# Patient Record
Sex: FEMALE | Race: WHITE | NOT HISPANIC OR LATINO | Employment: OTHER | URBAN - METROPOLITAN AREA
[De-identification: names, ages, dates, MRNs, and addresses within clinical notes are randomized per-mention and may not be internally consistent; named-entity substitution may affect disease eponyms.]

---

## 2023-03-27 PROBLEM — E55.9 VITAMIN D DEFICIENCY: Status: ACTIVE | Noted: 2023-03-27

## 2023-03-27 PROBLEM — E66.9 OBESITY (BMI 30.0-34.9): Status: ACTIVE | Noted: 2023-03-27

## 2023-03-27 PROBLEM — F32.A DEPRESSION: Status: ACTIVE | Noted: 2023-03-27

## 2023-03-27 PROBLEM — K13.0 ANGULAR CHEILITIS: Status: ACTIVE | Noted: 2023-03-27

## 2023-03-27 PROBLEM — E11.9 TYPE 2 DIABETES MELLITUS (MULTI): Status: ACTIVE | Noted: 2023-03-27

## 2023-03-27 PROBLEM — E78.5 HYPERLIPIDEMIA: Status: ACTIVE | Noted: 2023-03-27

## 2023-03-27 PROBLEM — E78.1 HYPERTRIGLYCERIDEMIA: Status: ACTIVE | Noted: 2023-03-27

## 2023-03-27 PROBLEM — K76.0 FATTY LIVER: Status: ACTIVE | Noted: 2023-03-27

## 2023-03-27 PROBLEM — E66.811 OBESITY (BMI 30.0-34.9): Status: ACTIVE | Noted: 2023-03-27

## 2023-03-27 PROBLEM — K21.9 GERD WITHOUT ESOPHAGITIS: Status: ACTIVE | Noted: 2023-03-27

## 2023-03-27 PROBLEM — B35.1 ONYCHOMYCOSIS OF TOENAIL: Status: ACTIVE | Noted: 2023-03-27

## 2023-03-27 PROBLEM — M25.511 SHOULDER PAIN, RIGHT: Status: ACTIVE | Noted: 2023-03-27

## 2023-03-27 PROBLEM — F41.1 GENERALIZED ANXIETY DISORDER: Status: ACTIVE | Noted: 2023-03-27

## 2023-03-27 RX ORDER — CHOLECALCIFEROL (VITAMIN D3) 125 MCG
CAPSULE ORAL
COMMUNITY
End: 2023-06-22 | Stop reason: SDUPTHER

## 2023-03-27 RX ORDER — LANCETS 33 GAUGE
EACH MISCELLANEOUS
COMMUNITY

## 2023-03-27 RX ORDER — TERBINAFINE HYDROCHLORIDE 250 MG/1
1 TABLET ORAL DAILY
COMMUNITY
Start: 2022-09-01 | End: 2023-11-09

## 2023-03-27 RX ORDER — OMEPRAZOLE 40 MG/1
1 CAPSULE, DELAYED RELEASE ORAL DAILY
COMMUNITY
Start: 2015-11-09 | End: 2023-12-05

## 2023-03-27 RX ORDER — BLOOD SUGAR DIAGNOSTIC
STRIP MISCELLANEOUS
COMMUNITY
Start: 2015-11-10

## 2023-03-27 RX ORDER — GLIMEPIRIDE 2 MG/1
2 TABLET ORAL 2 TIMES DAILY
COMMUNITY
Start: 2021-08-26 | End: 2024-02-01

## 2023-03-27 RX ORDER — CITALOPRAM 40 MG/1
1 TABLET, FILM COATED ORAL DAILY
COMMUNITY
Start: 2022-04-20 | End: 2023-12-05

## 2023-03-27 RX ORDER — METFORMIN HYDROCHLORIDE 500 MG/1
2 TABLET ORAL 2 TIMES DAILY
COMMUNITY
Start: 2021-06-21 | End: 2023-06-15

## 2023-03-27 RX ORDER — BUSPIRONE HYDROCHLORIDE 7.5 MG/1
1 TABLET ORAL 2 TIMES DAILY
COMMUNITY
Start: 2022-09-01 | End: 2023-09-25

## 2023-03-28 ENCOUNTER — OFFICE VISIT (OUTPATIENT)
Dept: PRIMARY CARE | Facility: CLINIC | Age: 53
End: 2023-03-28
Payer: COMMERCIAL

## 2023-03-28 VITALS
HEIGHT: 66 IN | BODY MASS INDEX: 29.57 KG/M2 | DIASTOLIC BLOOD PRESSURE: 70 MMHG | OXYGEN SATURATION: 97 % | HEART RATE: 71 BPM | WEIGHT: 184 LBS | SYSTOLIC BLOOD PRESSURE: 132 MMHG

## 2023-03-28 DIAGNOSIS — M25.562 CHRONIC PAIN OF LEFT KNEE: ICD-10-CM

## 2023-03-28 DIAGNOSIS — E11.9 TYPE 2 DIABETES MELLITUS WITHOUT COMPLICATION, WITHOUT LONG-TERM CURRENT USE OF INSULIN (MULTI): ICD-10-CM

## 2023-03-28 DIAGNOSIS — R42 DIZZINESS: Primary | ICD-10-CM

## 2023-03-28 DIAGNOSIS — G89.29 CHRONIC PAIN OF LEFT KNEE: ICD-10-CM

## 2023-03-28 PROBLEM — S83.105A DISLOCATION OF LEFT KNEE: Status: ACTIVE | Noted: 2023-03-28

## 2023-03-28 PROCEDURE — 3075F SYST BP GE 130 - 139MM HG: CPT | Performed by: STUDENT IN AN ORGANIZED HEALTH CARE EDUCATION/TRAINING PROGRAM

## 2023-03-28 PROCEDURE — 4004F PT TOBACCO SCREEN RCVD TLK: CPT | Performed by: STUDENT IN AN ORGANIZED HEALTH CARE EDUCATION/TRAINING PROGRAM

## 2023-03-28 PROCEDURE — 3078F DIAST BP <80 MM HG: CPT | Performed by: STUDENT IN AN ORGANIZED HEALTH CARE EDUCATION/TRAINING PROGRAM

## 2023-03-28 PROCEDURE — 99214 OFFICE O/P EST MOD 30 MIN: CPT | Performed by: STUDENT IN AN ORGANIZED HEALTH CARE EDUCATION/TRAINING PROGRAM

## 2023-03-28 RX ORDER — CYCLOBENZAPRINE HCL 10 MG
5 TABLET ORAL 3 TIMES DAILY
Qty: 15 TABLET | Refills: 0 | Status: SHIPPED | OUTPATIENT
Start: 2023-03-28 | End: 2023-03-28 | Stop reason: SDUPTHER

## 2023-03-28 RX ORDER — CYCLOBENZAPRINE HCL 10 MG
10 TABLET ORAL NIGHTLY PRN
Qty: 30 TABLET | Refills: 0 | Status: SHIPPED | OUTPATIENT
Start: 2023-03-28 | End: 2023-05-30

## 2023-03-28 ASSESSMENT — ENCOUNTER SYMPTOMS
WHEEZING: 0
NERVOUS/ANXIOUS: 0
ABDOMINAL PAIN: 0
COUGH: 0
WEAKNESS: 0
ABDOMINAL DISTENTION: 0
LOSS OF SENSATION IN FEET: 0
WOUND: 0
POLYDIPSIA: 0
SHORTNESS OF BREATH: 0
CONFUSION: 0
CHILLS: 0
FATIGUE: 0
POLYPHAGIA: 0
NAUSEA: 0
DEPRESSION: 0
CONSTIPATION: 0
TROUBLE SWALLOWING: 0
OCCASIONAL FEELINGS OF UNSTEADINESS: 0
HEADACHES: 0
DIZZINESS: 1
BLOOD IN STOOL: 0
SLEEP DISTURBANCE: 0
ACTIVITY CHANGE: 0
HEMATURIA: 0
FEVER: 0
SINUS PAIN: 0
EYE DISCHARGE: 0

## 2023-03-28 ASSESSMENT — PATIENT HEALTH QUESTIONNAIRE - PHQ9
SUM OF ALL RESPONSES TO PHQ9 QUESTIONS 1 AND 2: 0
2. FEELING DOWN, DEPRESSED OR HOPELESS: NOT AT ALL
1. LITTLE INTEREST OR PLEASURE IN DOING THINGS: NOT AT ALL

## 2023-03-28 ASSESSMENT — COLUMBIA-SUICIDE SEVERITY RATING SCALE - C-SSRS
1. IN THE PAST MONTH, HAVE YOU WISHED YOU WERE DEAD OR WISHED YOU COULD GO TO SLEEP AND NOT WAKE UP?: NO
2. HAVE YOU ACTUALLY HAD ANY THOUGHTS OF KILLING YOURSELF?: NO
6. HAVE YOU EVER DONE ANYTHING, STARTED TO DO ANYTHING, OR PREPARED TO DO ANYTHING TO END YOUR LIFE?: NO

## 2023-03-28 NOTE — PATIENT INSTRUCTIONS
It was nice meeting you today.     For your dizziness, you appear dehydrated I recommend you hydrate yourself well. Look up Epley maneuver    Knee pain, prescribed you flexeril and referred you to physical therapy     I encourage you to be active 15 -30 min each session as tolerated 3-5 times per week.      I encourage you to look up DASH diet and follow it.      If you need anything or have any questions, please call the clinic at [phone]     Follow up as scheduled

## 2023-03-28 NOTE — ASSESSMENT & PLAN NOTE
Borderline controlled with A1c of 7.2%. We discussed monitoring her glucose level due to multiple antidiabetic medications with potential for hypoglycemia. Repeat A1c in 3 months for monitoring

## 2023-03-28 NOTE — PROGRESS NOTES
Subjective   Patient ID: Rox Munoz is a 52 y.o. female who presents for Dizziness and Lt leg PAIN.  Dizziness  Pertinent negatives include no abdominal pain, chest pain, chills, congestion, coughing, fatigue, fever, headaches, nausea or weakness.     Medical history of GERD, diabetes, anxiety and depression presents to the clinic for evaluation of dizzy spells that been ongoing for the past 1 week. Closing her eyes helps. No chest pain, no trouble hearing.     Additionally she is also complaining of chronic knee pain.     Review of Systems   Constitutional:  Negative for activity change, chills, fatigue and fever.   HENT:  Negative for congestion, ear discharge, sinus pain and trouble swallowing.    Eyes:  Negative for discharge and visual disturbance.   Respiratory:  Negative for cough, shortness of breath and wheezing.    Cardiovascular:  Negative for chest pain and leg swelling.   Gastrointestinal:  Negative for abdominal distention, abdominal pain, blood in stool, constipation and nausea.   Endocrine: Negative for polydipsia and polyphagia.   Genitourinary:  Negative for hematuria.   Musculoskeletal:  Negative for gait problem.   Skin:  Negative for wound.   Allergic/Immunologic: Negative for food allergies.   Neurological:  Positive for dizziness. Negative for weakness and headaches.   Psychiatric/Behavioral:  Negative for confusion, sleep disturbance and suicidal ideas. The patient is not nervous/anxious.        Objective   Physical Exam  Vitals and nursing note reviewed.   Constitutional:       Appearance: Normal appearance. She is normal weight.   HENT:      Head: Normocephalic and atraumatic.      Right Ear: Tympanic membrane normal.      Left Ear: Tympanic membrane normal.      Mouth/Throat:      Mouth: Mucous membranes are dry.   Eyes:      Extraocular Movements: Extraocular movements intact.      Conjunctiva/sclera: Conjunctivae normal.   Cardiovascular:      Rate and Rhythm: Normal rate.       Pulses: Normal pulses.   Pulmonary:      Effort: Pulmonary effort is normal. No respiratory distress.      Breath sounds: Normal breath sounds.   Abdominal:      General: Bowel sounds are normal. There is no distension.      Palpations: Abdomen is soft. There is no mass.   Musculoskeletal:         General: Tenderness (L knee) present. Normal range of motion.      Cervical back: Normal range of motion and neck supple.   Skin:     General: Skin is warm and dry.   Neurological:      General: No focal deficit present.      Mental Status: She is alert. Mental status is at baseline.   Psychiatric:         Mood and Affect: Mood normal.         Assessment/Plan   Problem List Items Addressed This Visit       Type 2 diabetes mellitus (CMS/[facility])     Borderline controlled with A1c of 7.2%. We discussed monitoring her glucose level due to multiple antidiabetic medications with potential for hypoglycemia. Repeat A1c in 3 months for monitoring          Chronic pain of left knee     We recommend physical therapy and a trial of flexeril.          Relevant Medications    cyclobenzaprine (Flexeril) 10 mg tablet    Other Relevant Orders    Follow Up In [facility] - PCP    Referral to Physical Therapy    Dizziness - Primary     Likely 2/2 peripheral vertigo. Recommend patient try Epley maneuver first, if ineffective can try meclizine. Advised adequate hydration             Relevant Orders    Follow Up In [facility] - PCP

## 2023-03-28 NOTE — ASSESSMENT & PLAN NOTE
Likely 2/2 peripheral vertigo. Recommend patient try Epley maneuver first, if ineffective can try meclizine. Advised adequate hydration

## 2023-05-30 DIAGNOSIS — M25.562 CHRONIC PAIN OF LEFT KNEE: ICD-10-CM

## 2023-05-30 DIAGNOSIS — G89.29 CHRONIC PAIN OF LEFT KNEE: ICD-10-CM

## 2023-05-30 RX ORDER — CYCLOBENZAPRINE HCL 10 MG
10 TABLET ORAL NIGHTLY PRN
Qty: 30 TABLET | Refills: 0 | Status: SHIPPED | OUTPATIENT
Start: 2023-05-30 | End: 2023-06-22

## 2023-06-15 DIAGNOSIS — E11.9 TYPE 2 DIABETES MELLITUS WITHOUT COMPLICATIONS (MULTI): ICD-10-CM

## 2023-06-15 LAB
ALANINE AMINOTRANSFERASE (SGPT) (U/L) IN SER/PLAS: 31 U/L (ref 7–45)
ALBUMIN (G/DL) IN SER/PLAS: 4.4 G/DL (ref 3.4–5)
ALBUMIN (MG/L) IN URINE: 12.6 MG/L
ALBUMIN/CREATININE (UG/MG) IN URINE: 18.4 UG/MG CRT (ref 0–30)
ALKALINE PHOSPHATASE (U/L) IN SER/PLAS: 139 U/L (ref 33–110)
ANION GAP IN SER/PLAS: 14 MMOL/L (ref 10–20)
ASPARTATE AMINOTRANSFERASE (SGOT) (U/L) IN SER/PLAS: 20 U/L (ref 9–39)
BILIRUBIN TOTAL (MG/DL) IN SER/PLAS: 0.4 MG/DL (ref 0–1.2)
CALCIDIOL (25 OH VITAMIN D3) (NG/ML) IN SER/PLAS: 23 NG/ML
CALCIUM (MG/DL) IN SER/PLAS: 9.3 MG/DL (ref 8.6–10.3)
CARBON DIOXIDE, TOTAL (MMOL/L) IN SER/PLAS: 22 MMOL/L (ref 21–32)
CHLORIDE (MMOL/L) IN SER/PLAS: 107 MMOL/L (ref 98–107)
CHOLESTEROL (MG/DL) IN SER/PLAS: 193 MG/DL (ref 0–199)
CHOLESTEROL IN HDL (MG/DL) IN SER/PLAS: 37.8 MG/DL
CHOLESTEROL/HDL RATIO: 5.1
COBALAMIN (VITAMIN B12) (PG/ML) IN SER/PLAS: 494 PG/ML (ref 211–911)
CREATININE (MG/DL) IN SER/PLAS: 0.69 MG/DL (ref 0.5–1.05)
CREATININE (MG/DL) IN URINE: 68.6 MG/DL (ref 20–320)
ERYTHROCYTE DISTRIBUTION WIDTH (RATIO) BY AUTOMATED COUNT: 14 % (ref 11.5–14.5)
ERYTHROCYTE MEAN CORPUSCULAR HEMOGLOBIN CONCENTRATION (G/DL) BY AUTOMATED: 32.1 G/DL (ref 32–36)
ERYTHROCYTE MEAN CORPUSCULAR VOLUME (FL) BY AUTOMATED COUNT: 89 FL (ref 80–100)
ERYTHROCYTES (10*6/UL) IN BLOOD BY AUTOMATED COUNT: 5.57 X10E12/L (ref 4–5.2)
ESTIMATED AVERAGE GLUCOSE FOR HBA1C: 157 MG/DL
GFR FEMALE: >90 ML/MIN/1.73M2
GLUCOSE (MG/DL) IN SER/PLAS: 161 MG/DL (ref 74–99)
HEMATOCRIT (%) IN BLOOD BY AUTOMATED COUNT: 49.6 % (ref 36–46)
HEMOGLOBIN (G/DL) IN BLOOD: 15.9 G/DL (ref 12–16)
HEMOGLOBIN A1C/HEMOGLOBIN TOTAL IN BLOOD: 7.1 %
LDL: 112 MG/DL (ref 0–99)
LEUKOCYTES (10*3/UL) IN BLOOD BY AUTOMATED COUNT: 11.9 X10E9/L (ref 4.4–11.3)
NON HDL CHOLESTEROL: 155 MG/DL
PLATELETS (10*3/UL) IN BLOOD AUTOMATED COUNT: 241 X10E9/L (ref 150–450)
POTASSIUM (MMOL/L) IN SER/PLAS: 4.4 MMOL/L (ref 3.5–5.3)
PROTEIN TOTAL: 7.2 G/DL (ref 6.4–8.2)
SODIUM (MMOL/L) IN SER/PLAS: 139 MMOL/L (ref 136–145)
THYROTROPIN (MIU/L) IN SER/PLAS BY DETECTION LIMIT <= 0.05 MIU/L: 1.37 MIU/L (ref 0.44–3.98)
TRIGLYCERIDE (MG/DL) IN SER/PLAS: 218 MG/DL (ref 0–149)
UREA NITROGEN (MG/DL) IN SER/PLAS: 24 MG/DL (ref 6–23)
VLDL: 44 MG/DL (ref 0–40)

## 2023-06-15 RX ORDER — METFORMIN HYDROCHLORIDE 500 MG/1
TABLET ORAL
Qty: 360 TABLET | Refills: 1 | Status: SHIPPED | OUTPATIENT
Start: 2023-06-15 | End: 2024-05-31

## 2023-06-22 ENCOUNTER — TELEMEDICINE (OUTPATIENT)
Dept: PRIMARY CARE | Facility: CLINIC | Age: 53
End: 2023-06-22
Payer: COMMERCIAL

## 2023-06-22 DIAGNOSIS — M25.562 CHRONIC PAIN OF LEFT KNEE: ICD-10-CM

## 2023-06-22 DIAGNOSIS — G89.4 CHRONIC PAIN SYNDROME: ICD-10-CM

## 2023-06-22 DIAGNOSIS — E11.65 TYPE 2 DIABETES MELLITUS WITH HYPERGLYCEMIA, WITHOUT LONG-TERM CURRENT USE OF INSULIN (MULTI): ICD-10-CM

## 2023-06-22 DIAGNOSIS — G89.29 CHRONIC PAIN OF LEFT KNEE: ICD-10-CM

## 2023-06-22 DIAGNOSIS — E55.9 VITAMIN D DEFICIENCY: Primary | ICD-10-CM

## 2023-06-22 DIAGNOSIS — R42 DIZZINESS: ICD-10-CM

## 2023-06-22 PROCEDURE — 99213 OFFICE O/P EST LOW 20 MIN: CPT | Performed by: CLINICAL NURSE SPECIALIST

## 2023-06-22 RX ORDER — CHOLECALCIFEROL (VITAMIN D3) 125 MCG
CAPSULE ORAL
Qty: 90 CAPSULE | Refills: 3 | Status: SHIPPED | OUTPATIENT
Start: 2023-06-22

## 2023-06-22 RX ORDER — GABAPENTIN 300 MG/1
300 CAPSULE ORAL NIGHTLY
Qty: 30 CAPSULE | Refills: 5 | Status: SHIPPED | OUTPATIENT
Start: 2023-06-22 | End: 2023-08-22 | Stop reason: SDUPTHER

## 2023-06-22 ASSESSMENT — ENCOUNTER SYMPTOMS
ARTHRALGIAS: 1
LOSS OF SENSATION IN FEET: 0
CONSTIPATION: 0
MYALGIAS: 0
DIARRHEA: 0
SLEEP DISTURBANCE: 0
NECK PAIN: 0
COUGH: 0
OCCASIONAL FEELINGS OF UNSTEADINESS: 0
DIZZINESS: 0
PALPITATIONS: 0
HEADACHES: 0
PHOTOPHOBIA: 0
CONFUSION: 0
NAUSEA: 0
BLOOD IN STOOL: 0
JOINT SWELLING: 0
BACK PAIN: 1
FATIGUE: 0
FLANK PAIN: 0
APPETITE CHANGE: 0
FEVER: 0
CHILLS: 0
WOUND: 0
VOMITING: 0
ABDOMINAL PAIN: 0
UNEXPECTED WEIGHT CHANGE: 0
TROUBLE SWALLOWING: 0
SHORTNESS OF BREATH: 0
POLYDIPSIA: 0
HEMATURIA: 0
EYE PAIN: 0
CHEST TIGHTNESS: 0
DYSURIA: 0
WHEEZING: 0
SEIZURES: 0
SORE THROAT: 0
ACTIVITY CHANGE: 0
DEPRESSION: 0
BRUISES/BLEEDS EASILY: 0

## 2023-06-22 ASSESSMENT — COLUMBIA-SUICIDE SEVERITY RATING SCALE - C-SSRS
1. IN THE PAST MONTH, HAVE YOU WISHED YOU WERE DEAD OR WISHED YOU COULD GO TO SLEEP AND NOT WAKE UP?: NO
6. HAVE YOU EVER DONE ANYTHING, STARTED TO DO ANYTHING, OR PREPARED TO DO ANYTHING TO END YOUR LIFE?: NO
2. HAVE YOU ACTUALLY HAD ANY THOUGHTS OF KILLING YOURSELF?: NO

## 2023-06-22 ASSESSMENT — PATIENT HEALTH QUESTIONNAIRE - PHQ9
SUM OF ALL RESPONSES TO PHQ9 QUESTIONS 1 AND 2: 0
1. LITTLE INTEREST OR PLEASURE IN DOING THINGS: NOT AT ALL
2. FEELING DOWN, DEPRESSED OR HOPELESS: NOT AT ALL

## 2023-06-22 NOTE — PROGRESS NOTES
Subjective   Patient ID: Rox Munoz is a 52 y.o. female who presents for Follow-up (Follow up/Left knee pain, discuss gabapentin, and toe nail  fungus ).  HPI    Telephone visit today for a follow up appointment. Patient was unable to connect for a Virtual appointment.     Diabetes. Has been on Metformin, Jardiance and Glimepiride for management. Tolerating medications well. Lab work completed. Does not monitor her blood sugars at home often. Monitors foot care.     Previously had been on Celexa and Wellbutrin. Discontinued Wellbutrin, years ago. Not sure why she stopped medication. Has continued on Celexa. Added Buspirone. Continued medication.     Toenail Fungus. Tried topicals in the past that have not been successful. Started Terbinafine with some improvement, completed treatment and now worse again.     Chronic issues with back/hip/knee pain. Extends down the leg. Worse on palpation of the left knee. Denies any specific injury/trauma. Previously tried Gabapentin that was beneficial.     Omeprazole controls GI symptoms well. Symptoms worsen off of medication.     Review of Systems   Constitutional:  Negative for activity change, appetite change, chills, fatigue, fever and unexpected weight change.   HENT:  Negative for ear pain, hearing loss, nosebleeds, sore throat, tinnitus and trouble swallowing.    Eyes:  Negative for photophobia, pain and visual disturbance.   Respiratory:  Negative for cough, chest tightness, shortness of breath and wheezing.    Cardiovascular:  Negative for chest pain, palpitations and leg swelling.   Gastrointestinal:  Negative for abdominal pain, blood in stool, constipation, diarrhea, nausea and vomiting.   Endocrine: Negative for cold intolerance, heat intolerance, polydipsia and polyuria.   Genitourinary:  Negative for dysuria, flank pain and hematuria.   Musculoskeletal:  Positive for arthralgias and back pain. Negative for joint swelling, myalgias and neck pain.   Skin:   Negative for pallor, rash and wound.   Allergic/Immunologic: Negative for immunocompromised state.   Neurological:  Negative for dizziness, seizures and headaches.   Hematological:  Does not bruise/bleed easily.   Psychiatric/Behavioral:  Negative for confusion and sleep disturbance.        Objective   Physical Exam    Assessment/Plan        Reviewed results of blood work completed with patient.     Diabetes mellitus type 2. A1C 7.1%. Continue Metformin, Jardiance, and Glimepiride as previously prescribed. She has no history of diabetic complications. If level remains elevated will adjust medications. Previously was on injectables, did not tolerate.   Dyslipidemia. Improved on most recent lab work. She does not wish to be on prescription statin. Will continue Vascepa 2 capsules twice daily.   Fatty liver disease. Liver enzymes had improved with weight loss. Discussed ETOH cessation. Will monitor she had magnetic resonance imaging of the abdomen December 2016 with no evidence of lesions. Will monitor for signs of cirrhosis  gastroesophageal reflux disease with history of Casiano's esophagus. continue omeprazole daily. Repeat EGD December 2016, showed no evidence of Casiano's. She is currently asymptomatic. She was told that she did not need repeat EGD unless she was symptomatic.   Depression/anxiety. Continue Celexa and Buspirone. Reevaluate at follow up appointment.   Postmenopausal symptoms. Previously was on Estradiol and Progesterone, has since stopped medications. Taking OTC supplement for management.   Recurrent angular cheilitis. Responds well to oral Diflucan as needed.  Vitamin D deficiency. Reevaluate with next lab work.   Onychomycosis: Will hold oral treatment until updated liver enzymes completed.   Chronic Pain: Gabapentin as prescribed effective in the past. I have personally reviewed the OARRS report.  This report is scanned into the electronic medical record.  I have considered the risks of abuse,  dependence, addiction and diversion.  I believe that it is clinically appropriate to prescribe this medication.     Mammogram: December 2022.   Colonoscopy January 2020 repeat in 5 years recommended.  Tdap: March 2016  Flu Vaccine: October 2022.   Discussed Shingrix.   COVID Vaccine: March/April 2021.     The patient was interviewed via a secure video link due to the COVID pandemic. The link allowed real-time communication between the patient and the provider. The patient gave permission to perform the clinic visit through this mechanism. We were on the telephone call for approximately 11 minutes. More than 50% of that time was spent in counseling and coordination of care. No physical examination was performed.

## 2023-07-06 ENCOUNTER — TELEPHONE (OUTPATIENT)
Dept: PRIMARY CARE | Facility: CLINIC | Age: 53
End: 2023-07-06
Payer: COMMERCIAL

## 2023-07-06 NOTE — TELEPHONE ENCOUNTER
Patient has been dealing with sciatic pain since last Friday she is asking if she can come in and have a pain shot?    Would that be Kenalog? If so, do you want her on the nurse schedule?

## 2023-07-06 NOTE — TELEPHONE ENCOUNTER
She just had a recent follow up appointment. Ok to come in for injection. Ok for Nurse Visit. Thank you!

## 2023-07-07 ENCOUNTER — CLINICAL SUPPORT (OUTPATIENT)
Dept: PRIMARY CARE | Facility: CLINIC | Age: 53
End: 2023-07-07
Payer: COMMERCIAL

## 2023-07-07 DIAGNOSIS — G89.29 CHRONIC PAIN OF LEFT KNEE: ICD-10-CM

## 2023-07-07 DIAGNOSIS — M25.562 CHRONIC PAIN OF LEFT KNEE: ICD-10-CM

## 2023-07-07 PROCEDURE — 96372 THER/PROPH/DIAG INJ SC/IM: CPT | Performed by: CLINICAL NURSE SPECIALIST

## 2023-08-22 ENCOUNTER — OFFICE VISIT (OUTPATIENT)
Dept: PRIMARY CARE | Facility: CLINIC | Age: 53
End: 2023-08-22
Payer: COMMERCIAL

## 2023-08-22 VITALS
BODY MASS INDEX: 29.41 KG/M2 | WEIGHT: 183 LBS | HEIGHT: 66 IN | HEART RATE: 84 BPM | DIASTOLIC BLOOD PRESSURE: 70 MMHG | SYSTOLIC BLOOD PRESSURE: 138 MMHG

## 2023-08-22 DIAGNOSIS — M25.562 CHRONIC PAIN OF LEFT KNEE: ICD-10-CM

## 2023-08-22 DIAGNOSIS — E55.9 VITAMIN D DEFICIENCY: ICD-10-CM

## 2023-08-22 DIAGNOSIS — G89.29 CHRONIC PAIN OF LEFT KNEE: ICD-10-CM

## 2023-08-22 DIAGNOSIS — G89.4 CHRONIC PAIN SYNDROME: ICD-10-CM

## 2023-08-22 DIAGNOSIS — E78.2 MIXED HYPERLIPIDEMIA: ICD-10-CM

## 2023-08-22 DIAGNOSIS — E78.1 HYPERTRIGLYCERIDEMIA: ICD-10-CM

## 2023-08-22 DIAGNOSIS — Z23 NEED FOR SHINGLES VACCINE: ICD-10-CM

## 2023-08-22 DIAGNOSIS — E11.65 TYPE 2 DIABETES MELLITUS WITH HYPERGLYCEMIA, WITHOUT LONG-TERM CURRENT USE OF INSULIN (MULTI): ICD-10-CM

## 2023-08-22 DIAGNOSIS — Z12.31 VISIT FOR SCREENING MAMMOGRAM: Primary | ICD-10-CM

## 2023-08-22 PROBLEM — E66.9 OBESITY (BMI 30.0-34.9): Status: RESOLVED | Noted: 2023-03-27 | Resolved: 2023-08-22

## 2023-08-22 PROBLEM — E66.811 OBESITY (BMI 30.0-34.9): Status: RESOLVED | Noted: 2023-03-27 | Resolved: 2023-08-22

## 2023-08-22 PROCEDURE — 3051F HG A1C>EQUAL 7.0%<8.0%: CPT | Performed by: CLINICAL NURSE SPECIALIST

## 2023-08-22 PROCEDURE — 90471 IMMUNIZATION ADMIN: CPT | Performed by: CLINICAL NURSE SPECIALIST

## 2023-08-22 PROCEDURE — 99214 OFFICE O/P EST MOD 30 MIN: CPT | Performed by: CLINICAL NURSE SPECIALIST

## 2023-08-22 PROCEDURE — 90750 HZV VACC RECOMBINANT IM: CPT | Performed by: CLINICAL NURSE SPECIALIST

## 2023-08-22 PROCEDURE — 3075F SYST BP GE 130 - 139MM HG: CPT | Performed by: CLINICAL NURSE SPECIALIST

## 2023-08-22 PROCEDURE — 4004F PT TOBACCO SCREEN RCVD TLK: CPT | Performed by: CLINICAL NURSE SPECIALIST

## 2023-08-22 PROCEDURE — 3078F DIAST BP <80 MM HG: CPT | Performed by: CLINICAL NURSE SPECIALIST

## 2023-08-22 RX ORDER — GABAPENTIN 300 MG/1
300 CAPSULE ORAL 2 TIMES DAILY
Qty: 60 CAPSULE | Refills: 2 | Status: SHIPPED | OUTPATIENT
Start: 2023-08-22 | End: 2024-02-01

## 2023-08-22 ASSESSMENT — ENCOUNTER SYMPTOMS
CHILLS: 0
MYALGIAS: 0
COUGH: 0
DIARRHEA: 0
OCCASIONAL FEELINGS OF UNSTEADINESS: 0
DYSURIA: 0
DEPRESSION: 0
FLANK PAIN: 0
CONSTIPATION: 0
SEIZURES: 0
TROUBLE SWALLOWING: 0
VOMITING: 0
LOSS OF SENSATION IN FEET: 0
HEADACHES: 0
EYE PAIN: 0
NAUSEA: 0
JOINT SWELLING: 0
ACTIVITY CHANGE: 0
CONFUSION: 0
ARTHRALGIAS: 1
APPETITE CHANGE: 0
NECK PAIN: 0
PHOTOPHOBIA: 0
HEMATURIA: 0
BACK PAIN: 0
WHEEZING: 0
ABDOMINAL PAIN: 0
PALPITATIONS: 0
BLOOD IN STOOL: 0
FEVER: 0
FATIGUE: 0
UNEXPECTED WEIGHT CHANGE: 0
POLYDIPSIA: 0
SORE THROAT: 0
DIZZINESS: 0
SHORTNESS OF BREATH: 0
BRUISES/BLEEDS EASILY: 0
SLEEP DISTURBANCE: 0
WOUND: 0
CHEST TIGHTNESS: 0

## 2023-08-22 ASSESSMENT — PATIENT HEALTH QUESTIONNAIRE - PHQ9
1. LITTLE INTEREST OR PLEASURE IN DOING THINGS: NOT AT ALL
SUM OF ALL RESPONSES TO PHQ9 QUESTIONS 1 AND 2: 0
2. FEELING DOWN, DEPRESSED OR HOPELESS: NOT AT ALL

## 2023-08-22 ASSESSMENT — COLUMBIA-SUICIDE SEVERITY RATING SCALE - C-SSRS
2. HAVE YOU ACTUALLY HAD ANY THOUGHTS OF KILLING YOURSELF?: NO
6. HAVE YOU EVER DONE ANYTHING, STARTED TO DO ANYTHING, OR PREPARED TO DO ANYTHING TO END YOUR LIFE?: NO
1. IN THE PAST MONTH, HAVE YOU WISHED YOU WERE DEAD OR WISHED YOU COULD GO TO SLEEP AND NOT WAKE UP?: NO

## 2023-08-22 NOTE — PROGRESS NOTES
Subjective   Patient ID: Rox Munoz is a 52 y.o. female who presents for Follow-up (Follow up).  HPI    Diabetes. Has been on Metformin, Jardiance and Glimepiride for management. Tolerating medications well. Lab work completed. Does not monitor her blood sugars at home often. Monitors foot care.      Previously had been on Celexa and Wellbutrin. Discontinued Wellbutrin, years ago. Not sure why she stopped medication. Has continued on Celexa. Added Buspirone. Continued medication. Has used rarely, PRN.      Toenail Fungus. Tried topicals in the past that have not been successful. Started Terbinafine with some improvement, completed treatment and now worse again. Planning to follow with Podiatrist. Dr. Kim.      Chronic issues with back/hip/knee pain. Extends down the leg. Worse on palpation of the left knee. Denies any specific injury/trauma. Previously tried Gabapentin that was beneficial. Restarted after last OV. Has worked well for her. At times will need a  second pill, increased effectiveness of medication.      Omeprazole controls GI symptoms well. Symptoms worsen off of medication.       Review of Systems   Constitutional:  Negative for activity change, appetite change, chills, fatigue, fever and unexpected weight change.   HENT:  Negative for ear pain, hearing loss, nosebleeds, sore throat, tinnitus and trouble swallowing.    Eyes:  Negative for photophobia, pain and visual disturbance.   Respiratory:  Negative for cough, chest tightness, shortness of breath and wheezing.    Cardiovascular:  Negative for chest pain, palpitations and leg swelling.   Gastrointestinal:  Negative for abdominal pain, blood in stool, constipation, diarrhea, nausea and vomiting.   Endocrine: Negative for cold intolerance, heat intolerance, polydipsia and polyuria.   Genitourinary:  Negative for dysuria, flank pain and hematuria.   Musculoskeletal:  Positive for arthralgias. Negative for back pain, joint swelling, myalgias  and neck pain.   Skin:  Negative for pallor, rash and wound.   Allergic/Immunologic: Negative for immunocompromised state.   Neurological:  Negative for dizziness, seizures and headaches.   Hematological:  Does not bruise/bleed easily.   Psychiatric/Behavioral:  Negative for confusion and sleep disturbance.        Objective   Physical Exam  Vitals and nursing note reviewed.   Constitutional:       General: She is not in acute distress.     Appearance: Normal appearance.   HENT:      Head: Normocephalic.      Nose: Nose normal.   Eyes:      Conjunctiva/sclera: Conjunctivae normal.   Neck:      Vascular: No carotid bruit.   Cardiovascular:      Rate and Rhythm: Normal rate and regular rhythm.      Pulses: Normal pulses.      Heart sounds: Normal heart sounds.   Pulmonary:      Effort: Pulmonary effort is normal.      Breath sounds: Normal breath sounds.   Abdominal:      General: Bowel sounds are normal.      Palpations: Abdomen is soft.   Musculoskeletal:         General: Normal range of motion.      Cervical back: Normal range of motion.   Skin:     General: Skin is warm and dry.   Neurological:      Mental Status: She is alert and oriented to person, place, and time. Mental status is at baseline.   Psychiatric:         Mood and Affect: Mood normal.         Behavior: Behavior normal.         Assessment/Plan         Reviewed results of blood work completed with patient.      Diabetes mellitus type 2. A1C 7.1%. Continue Metformin, Jardiance, and Glimepiride as previously prescribed. She has no history of diabetic complications. If level remains elevated will adjust medications. Previously was on injectables, did not tolerate.   Dyslipidemia. Improved on most recent lab work. She does not wish to be on prescription statin. Will continue Vascepa 2 capsules twice daily.   Fatty liver disease. Liver enzymes had improved with weight loss. Discussed ETOH cessation. Will monitor she had magnetic resonance imaging of the  abdomen December 2016 with no evidence of lesions. Will monitor for signs of cirrhosis  gastroesophageal reflux disease with history of Casiano's esophagus. continue omeprazole daily. Repeat EGD December 2016, showed no evidence of Casiano's. She is currently asymptomatic. She was told that she did not need repeat EGD unless she was symptomatic.   Depression/anxiety. Continue Celexa and Buspirone. Reevaluate at follow up appointment.   Postmenopausal symptoms. Previously was on Estradiol and Progesterone, has since stopped medications. Taking OTC supplement for management.   Recurrent angular cheilitis. Responds well to oral Diflucan as needed.  Vitamin D deficiency. Reevaluate with next lab work.   Onychomycosis: Will hold oral treatment until updated liver enzymes completed.   Chronic Pain: Gabapentin as prescribed effective in the past. I have personally reviewed the OARRS report.  This report is scanned into the electronic medical record.  I have considered the risks of abuse, dependence, addiction and diversion.  I believe that it is clinically appropriate to prescribe this medication.      Mammogram: December 2022. Ordered for 2023.  Colonoscopy January 2020 repeat in 5 years recommended.  Tdap: March 2016  Flu Vaccine: October 2022.   Shingrix: August 2023.   COVID Vaccine: March/April 2021.

## 2023-08-31 ENCOUNTER — TELEPHONE (OUTPATIENT)
Dept: PRIMARY CARE | Facility: CLINIC | Age: 53
End: 2023-08-31
Payer: COMMERCIAL

## 2023-08-31 DIAGNOSIS — B37.9 YEAST INFECTION: Primary | ICD-10-CM

## 2023-09-06 RX ORDER — FLUCONAZOLE 150 MG/1
150 TABLET ORAL ONCE
Qty: 1 TABLET | Refills: 1 | Status: SHIPPED | OUTPATIENT
Start: 2023-09-06 | End: 2023-09-06

## 2023-09-06 NOTE — TELEPHONE ENCOUNTER
Patient notified. Patient wanting to know since she has to hold that medication asking something for a yeast infection.

## 2023-09-23 DIAGNOSIS — F41.1 GENERALIZED ANXIETY DISORDER: ICD-10-CM

## 2023-09-25 RX ORDER — BUSPIRONE HYDROCHLORIDE 7.5 MG/1
7.5 TABLET ORAL 2 TIMES DAILY
Qty: 180 TABLET | Refills: 3 | Status: SHIPPED | OUTPATIENT
Start: 2023-09-25

## 2023-12-05 DIAGNOSIS — K21.9 GASTRO-ESOPHAGEAL REFLUX DISEASE WITHOUT ESOPHAGITIS: ICD-10-CM

## 2023-12-05 DIAGNOSIS — F32.A DEPRESSION, UNSPECIFIED: ICD-10-CM

## 2023-12-05 RX ORDER — CITALOPRAM 40 MG/1
40 TABLET, FILM COATED ORAL DAILY
Qty: 90 TABLET | Refills: 3 | Status: SHIPPED | OUTPATIENT
Start: 2023-12-05

## 2023-12-05 RX ORDER — OMEPRAZOLE 40 MG/1
40 CAPSULE, DELAYED RELEASE ORAL DAILY
Qty: 90 CAPSULE | Refills: 3 | Status: SHIPPED | OUTPATIENT
Start: 2023-12-05

## 2023-12-28 ENCOUNTER — LAB (OUTPATIENT)
Dept: LAB | Facility: LAB | Age: 53
End: 2023-12-28
Payer: COMMERCIAL

## 2023-12-28 DIAGNOSIS — M25.562 CHRONIC PAIN OF LEFT KNEE: ICD-10-CM

## 2023-12-28 DIAGNOSIS — G89.29 CHRONIC PAIN OF LEFT KNEE: ICD-10-CM

## 2023-12-28 DIAGNOSIS — E55.9 VITAMIN D DEFICIENCY: ICD-10-CM

## 2023-12-28 DIAGNOSIS — Z12.31 VISIT FOR SCREENING MAMMOGRAM: ICD-10-CM

## 2023-12-28 DIAGNOSIS — G89.4 CHRONIC PAIN SYNDROME: ICD-10-CM

## 2023-12-28 LAB
ALBUMIN SERPL BCP-MCNC: 4 G/DL (ref 3.4–5)
ALP SERPL-CCNC: 122 U/L (ref 33–110)
ALT SERPL W P-5'-P-CCNC: 24 U/L (ref 7–45)
ANION GAP SERPL CALC-SCNC: 14 MMOL/L (ref 10–20)
AST SERPL W P-5'-P-CCNC: 17 U/L (ref 9–39)
BILIRUB SERPL-MCNC: 0.4 MG/DL (ref 0–1.2)
BUN SERPL-MCNC: 16 MG/DL (ref 6–23)
CALCIUM SERPL-MCNC: 8.7 MG/DL (ref 8.6–10.3)
CHLORIDE SERPL-SCNC: 106 MMOL/L (ref 98–107)
CHOLEST SERPL-MCNC: 216 MG/DL (ref 0–199)
CHOLESTEROL/HDL RATIO: 6
CO2 SERPL-SCNC: 23 MMOL/L (ref 21–32)
CREAT SERPL-MCNC: 0.65 MG/DL (ref 0.5–1.05)
ERYTHROCYTE [DISTWIDTH] IN BLOOD BY AUTOMATED COUNT: 13.7 % (ref 11.5–14.5)
GFR SERPL CREATININE-BSD FRML MDRD: >90 ML/MIN/1.73M*2
GLUCOSE SERPL-MCNC: 166 MG/DL (ref 74–99)
HCT VFR BLD AUTO: 48.7 % (ref 36–46)
HDLC SERPL-MCNC: 36.1 MG/DL
HGB BLD-MCNC: 15.9 G/DL (ref 12–16)
LDLC SERPL CALC-MCNC: 131 MG/DL
MCH RBC QN AUTO: 29.2 PG (ref 26–34)
MCHC RBC AUTO-ENTMCNC: 32.6 G/DL (ref 32–36)
MCV RBC AUTO: 90 FL (ref 80–100)
NON HDL CHOLESTEROL: 180 MG/DL (ref 0–149)
NRBC BLD-RTO: 0 /100 WBCS (ref 0–0)
PLATELET # BLD AUTO: 226 X10*3/UL (ref 150–450)
POTASSIUM SERPL-SCNC: 4.3 MMOL/L (ref 3.5–5.3)
PROT SERPL-MCNC: 6.3 G/DL (ref 6.4–8.2)
RBC # BLD AUTO: 5.44 X10*6/UL (ref 4–5.2)
SODIUM SERPL-SCNC: 139 MMOL/L (ref 136–145)
TRIGL SERPL-MCNC: 244 MG/DL (ref 0–149)
TSH SERPL-ACNC: 1.11 MIU/L (ref 0.44–3.98)
VIT B12 SERPL-MCNC: 491 PG/ML (ref 211–911)
VLDL: 49 MG/DL (ref 0–40)
WBC # BLD AUTO: 9.4 X10*3/UL (ref 4.4–11.3)

## 2023-12-28 PROCEDURE — 85027 COMPLETE CBC AUTOMATED: CPT

## 2023-12-28 PROCEDURE — 84443 ASSAY THYROID STIM HORMONE: CPT

## 2023-12-28 PROCEDURE — 80061 LIPID PANEL: CPT

## 2023-12-28 PROCEDURE — 80053 COMPREHEN METABOLIC PANEL: CPT

## 2023-12-28 PROCEDURE — 82607 VITAMIN B-12: CPT

## 2023-12-28 PROCEDURE — 36415 COLL VENOUS BLD VENIPUNCTURE: CPT

## 2024-01-20 DIAGNOSIS — E11.65 TYPE 2 DIABETES MELLITUS WITH HYPERGLYCEMIA, WITHOUT LONG-TERM CURRENT USE OF INSULIN (MULTI): ICD-10-CM

## 2024-01-22 RX ORDER — EMPAGLIFLOZIN 25 MG/1
25 TABLET, FILM COATED ORAL DAILY
Qty: 90 TABLET | Refills: 2 | Status: SHIPPED | OUTPATIENT
Start: 2024-01-22

## 2024-01-23 ENCOUNTER — TELEPHONE (OUTPATIENT)
Dept: PRIMARY CARE | Facility: CLINIC | Age: 54
End: 2024-01-23
Payer: COMMERCIAL

## 2024-01-23 DIAGNOSIS — B37.9 YEAST INFECTION: Primary | ICD-10-CM

## 2024-01-23 RX ORDER — FLUCONAZOLE 150 MG/1
150 TABLET ORAL ONCE
Qty: 2 TABLET | Refills: 0 | Status: SHIPPED | OUTPATIENT
Start: 2024-01-23 | End: 2024-01-23

## 2024-01-23 NOTE — TELEPHONE ENCOUNTER
She has a yeast infection (vaginal) & asking if you would send in Rx for her ?      To [facility] [place]

## 2024-02-01 DIAGNOSIS — G89.4 CHRONIC PAIN SYNDROME: ICD-10-CM

## 2024-02-01 DIAGNOSIS — E11.9 TYPE 2 DIABETES MELLITUS WITHOUT COMPLICATIONS (MULTI): ICD-10-CM

## 2024-02-01 RX ORDER — GABAPENTIN 300 MG/1
300 CAPSULE ORAL 2 TIMES DAILY
Qty: 180 CAPSULE | Refills: 1 | Status: SHIPPED | OUTPATIENT
Start: 2024-02-01 | End: 2024-07-30

## 2024-02-01 RX ORDER — GLIMEPIRIDE 2 MG/1
2 TABLET ORAL
Qty: 180 TABLET | Refills: 1 | Status: SHIPPED | OUTPATIENT
Start: 2024-02-01 | End: 2024-07-30

## 2024-02-15 ENCOUNTER — TELEPHONE (OUTPATIENT)
Dept: PRIMARY CARE | Facility: CLINIC | Age: 54
End: 2024-02-15
Payer: COMMERCIAL

## 2024-02-15 DIAGNOSIS — B37.9 YEAST INFECTION: Primary | ICD-10-CM

## 2024-02-15 RX ORDER — FLUCONAZOLE 150 MG/1
150 TABLET ORAL ONCE
Qty: 1 TABLET | Refills: 0 | Status: SHIPPED | OUTPATIENT
Start: 2024-02-15 | End: 2024-02-22 | Stop reason: ALTCHOICE

## 2024-02-15 NOTE — TELEPHONE ENCOUNTER
Fairly controlled  Hypoglycemia episodes  - BG monitored with CGM ([employer] Ran 2)  -Last A1C: 6.5% in 5/2023, POCT A1C in office today 7.5%  - Given hypoglycemic episodes, will discontinue Novolin.   - Increase Metformin to 1000 mg BID   - Continue pioglitazone 15 mg daily   - Start Farxiga 10 mg daily   - Would like to start GLP1 agonist if insurance covered. Pt will check and let me know.      -Complication: CKD 3, see nephro, CAD, see cards  -Hyperlipidemia, LDL goal is <70 mg/dl, on Statin  -Microalbumin: due   -Hypertension: goal < 130/80  -Foot exam: due   -Eye exam: due, last exam in 1/2023 normal no diabetic retinopathy    The patient was advised to monitor blood sugar at home.   Continue low carb diet.  Diabetes Care: recommend yearly eye exam, foot exam and urine microalbumin to creatinine ratio.             Medication E-prescribed.

## 2024-02-15 NOTE — TELEPHONE ENCOUNTER
Patient started medicine on 1/23 for yeast infection and it hasn't cleared up yet. Can [facility]

## 2024-02-22 ENCOUNTER — OFFICE VISIT (OUTPATIENT)
Dept: PRIMARY CARE | Facility: CLINIC | Age: 54
End: 2024-02-22
Payer: COMMERCIAL

## 2024-02-22 VITALS
HEIGHT: 66 IN | SYSTOLIC BLOOD PRESSURE: 130 MMHG | BODY MASS INDEX: 29.57 KG/M2 | DIASTOLIC BLOOD PRESSURE: 70 MMHG | HEART RATE: 84 BPM | WEIGHT: 184 LBS

## 2024-02-22 DIAGNOSIS — G89.4 CHRONIC PAIN SYNDROME: ICD-10-CM

## 2024-02-22 DIAGNOSIS — E55.9 VITAMIN D DEFICIENCY: ICD-10-CM

## 2024-02-22 DIAGNOSIS — G89.29 CHRONIC PAIN OF LEFT KNEE: ICD-10-CM

## 2024-02-22 DIAGNOSIS — K21.9 GASTRO-ESOPHAGEAL REFLUX DISEASE WITHOUT ESOPHAGITIS: ICD-10-CM

## 2024-02-22 DIAGNOSIS — M25.562 CHRONIC PAIN OF LEFT KNEE: ICD-10-CM

## 2024-02-22 DIAGNOSIS — E11.9 TYPE 2 DIABETES MELLITUS WITHOUT COMPLICATIONS (MULTI): ICD-10-CM

## 2024-02-22 DIAGNOSIS — Z12.31 VISIT FOR SCREENING MAMMOGRAM: ICD-10-CM

## 2024-02-22 DIAGNOSIS — F32.A DEPRESSION, UNSPECIFIED: ICD-10-CM

## 2024-02-22 LAB — POC HEMOGLOBIN A1C: 7.9 % (ref 4.2–6.5)

## 2024-02-22 PROCEDURE — 83036 HEMOGLOBIN GLYCOSYLATED A1C: CPT | Performed by: CLINICAL NURSE SPECIALIST

## 2024-02-22 PROCEDURE — 90750 HZV VACC RECOMBINANT IM: CPT | Performed by: CLINICAL NURSE SPECIALIST

## 2024-02-22 PROCEDURE — 3078F DIAST BP <80 MM HG: CPT | Performed by: CLINICAL NURSE SPECIALIST

## 2024-02-22 PROCEDURE — 90471 IMMUNIZATION ADMIN: CPT | Performed by: CLINICAL NURSE SPECIALIST

## 2024-02-22 PROCEDURE — 90677 PCV20 VACCINE IM: CPT | Performed by: CLINICAL NURSE SPECIALIST

## 2024-02-22 PROCEDURE — 99214 OFFICE O/P EST MOD 30 MIN: CPT | Performed by: CLINICAL NURSE SPECIALIST

## 2024-02-22 PROCEDURE — 90472 IMMUNIZATION ADMIN EACH ADD: CPT | Performed by: CLINICAL NURSE SPECIALIST

## 2024-02-22 PROCEDURE — 3075F SYST BP GE 130 - 139MM HG: CPT | Performed by: CLINICAL NURSE SPECIALIST

## 2024-02-22 ASSESSMENT — ENCOUNTER SYMPTOMS
FATIGUE: 0
BACK PAIN: 0
HEMATURIA: 0
DIARRHEA: 0
SORE THROAT: 0
CHEST TIGHTNESS: 0
PALPITATIONS: 0
OCCASIONAL FEELINGS OF UNSTEADINESS: 0
PHOTOPHOBIA: 0
EYE PAIN: 0
CHILLS: 0
CONFUSION: 0
SEIZURES: 0
FLANK PAIN: 0
UNEXPECTED WEIGHT CHANGE: 0
COUGH: 0
POLYDIPSIA: 0
MYALGIAS: 0
HEADACHES: 0
JOINT SWELLING: 0
APPETITE CHANGE: 0
ABDOMINAL PAIN: 0
BLOOD IN STOOL: 0
NECK PAIN: 0
NAUSEA: 0
DEPRESSION: 0
DYSURIA: 0
WOUND: 0
CONSTIPATION: 0
BRUISES/BLEEDS EASILY: 0
SLEEP DISTURBANCE: 0
WHEEZING: 0
VOMITING: 0
TROUBLE SWALLOWING: 0
FEVER: 0
SHORTNESS OF BREATH: 0
ACTIVITY CHANGE: 0
LOSS OF SENSATION IN FEET: 0
DIZZINESS: 0
ARTHRALGIAS: 0

## 2024-02-22 ASSESSMENT — PATIENT HEALTH QUESTIONNAIRE - PHQ9
2. FEELING DOWN, DEPRESSED OR HOPELESS: NOT AT ALL
SUM OF ALL RESPONSES TO PHQ9 QUESTIONS 1 AND 2: 0
1. LITTLE INTEREST OR PLEASURE IN DOING THINGS: NOT AT ALL

## 2024-02-22 NOTE — PROGRESS NOTES
Subjective   Patient ID: Rox Munoz is a 53 y.o. female who presents for Follow-up (Follow up).  HPI    Diabetes. Has been on Metformin, Jardiance and Glimepiride for management. Tolerating medications well. Lab work completed. Due for updated A1C. Does not monitor her blood sugars at home often. Monitors foot care. Has been taking two pills of Metformin a day.       Previously had been on Celexa and Wellbutrin. Discontinued Wellbutrin, years ago. Not sure why she stopped medication. Has continued on Celexa. Added Buspirone. Continued medication. Has used rarely, PRN.      Chronic issues with back/hip/knee pain. Extends down the leg. Worse on palpation of the left knee. Denies any specific injury/trauma. Previously tried Gabapentin that was beneficial. Restarted after last OV. Has worked well for her. At times will need a  second pill, increased effectiveness of medication. Still with knee discomfort.      Omeprazole controls GI symptoms well. Symptoms worsen off of medication.     Review of Systems   Constitutional:  Negative for activity change, appetite change, chills, fatigue, fever and unexpected weight change.   HENT:  Negative for ear pain, hearing loss, nosebleeds, sore throat, tinnitus and trouble swallowing.    Eyes:  Negative for photophobia, pain and visual disturbance.   Respiratory:  Negative for cough, chest tightness, shortness of breath and wheezing.    Cardiovascular:  Negative for chest pain, palpitations and leg swelling.   Gastrointestinal:  Negative for abdominal pain, blood in stool, constipation, diarrhea, nausea and vomiting.   Endocrine: Negative for cold intolerance, heat intolerance, polydipsia and polyuria.   Genitourinary:  Negative for dysuria, flank pain and hematuria.   Musculoskeletal:  Negative for arthralgias, back pain, joint swelling, myalgias and neck pain.   Skin:  Negative for pallor, rash and wound.   Allergic/Immunologic: Negative for immunocompromised state.    Neurological:  Negative for dizziness, seizures and headaches.   Hematological:  Does not bruise/bleed easily.   Psychiatric/Behavioral:  Negative for confusion and sleep disturbance.        Objective   Physical Exam  Vitals and nursing note reviewed.   Constitutional:       General: She is not in acute distress.     Appearance: Normal appearance.   HENT:      Head: Normocephalic.      Nose: Nose normal.   Eyes:      Conjunctiva/sclera: Conjunctivae normal.   Neck:      Vascular: No carotid bruit.   Cardiovascular:      Rate and Rhythm: Normal rate and regular rhythm.      Pulses: Normal pulses.      Heart sounds: Normal heart sounds.   Pulmonary:      Effort: Pulmonary effort is normal.      Breath sounds: Normal breath sounds.   Abdominal:      General: Bowel sounds are normal.      Palpations: Abdomen is soft.   Musculoskeletal:         General: Normal range of motion.      Cervical back: Normal range of motion.   Skin:     General: Skin is warm and dry.   Neurological:      Mental Status: She is alert and oriented to person, place, and time. Mental status is at baseline.   Psychiatric:         Mood and Affect: Mood normal.         Behavior: Behavior normal.       Assessment/Plan        Reviewed results of blood work completed with patient.      Diabetes mellitus type 2. A1C 7.9%. Continue Metformin, taking the full dosage, Jardiance, and Glimepiride as previously prescribed. She has no history of diabetic complications. If level remains elevated will further adjust medications. Previously was on injectables, did not tolerate. Albumin ordered.   Dyslipidemia. Updated lab work ordered. She does not wish to be on prescription statin. Will restart Vascepa 2 capsules twice daily. CT Cardiac Scoring ordered.   Fatty liver disease. Liver enzymes had improved with weight loss. Discussed ETOH cessation. Will monitor she had magnetic resonance imaging of the abdomen December 2016 with no evidence of lesions. Will  monitor for signs of cirrhosis  gastroesophageal reflux disease with history of Casiano's esophagus. continue omeprazole daily. Repeat EGD December 2016, showed no evidence of Casiano's. She is currently asymptomatic. She was told that she did not need repeat EGD unless she was symptomatic.   Depression/anxiety. Continue Celexa and Buspirone. Reevaluate at follow up appointment.   Postmenopausal symptoms. Previously was on Estradiol and Progesterone, has since stopped medications. Taking OTC supplement for management.   Recurrent angular cheilitis. Responds well to oral Diflucan as needed.  Vitamin D deficiency. Reevaluate with next lab work.   Chronic Pain: Gabapentin as prescribed effective in the past. I have personally reviewed the OARRS report.  This report is scanned into the electronic medical record.  I have considered the risks of abuse, dependence, addiction and diversion.  I believe that it is clinically appropriate to prescribe this medication  Knee Pain: Updated imaging ordered. Will follow up pending results.      Mammogram: December 2022. Ordered for 2024.  Colonoscopy January 2020 repeat in 5 years recommended.  Tdap: March 2016  Flu Vaccine: Novemgber 2023.   Shingrix: August 2023. February 2024.   Prevnar: February 2024.   COVID Vaccine: November 2023.     Kaitlin Rhoades, APRN-CNS 02/22/24 8:36 AM

## 2024-03-01 ENCOUNTER — TELEPHONE (OUTPATIENT)
Dept: PRIMARY CARE | Facility: CLINIC | Age: 54
End: 2024-03-01
Payer: COMMERCIAL

## 2024-03-01 DIAGNOSIS — B37.9 YEAST INFECTION: Primary | ICD-10-CM

## 2024-03-01 RX ORDER — FLUCONAZOLE 150 MG/1
150 TABLET ORAL ONCE
Qty: 1 TABLET | Refills: 0 | Status: SHIPPED | OUTPATIENT
Start: 2024-03-01 | End: 2024-03-01

## 2024-03-01 NOTE — TELEPHONE ENCOUNTER
Patient asking for fluconazole, still has a yeast infection says its been going on one month, has not been able to get rid of it   [facility] [place]

## 2024-03-18 ENCOUNTER — TELEPHONE (OUTPATIENT)
Dept: PRIMARY CARE | Facility: CLINIC | Age: 54
End: 2024-03-18
Payer: COMMERCIAL

## 2024-03-18 DIAGNOSIS — B37.9 YEAST INFECTION: Primary | ICD-10-CM

## 2024-03-18 RX ORDER — FLUCONAZOLE 150 MG/1
150 TABLET ORAL ONCE
Qty: 1 TABLET | Refills: 1 | Status: SHIPPED | OUTPATIENT
Start: 2024-03-18 | End: 2024-03-22 | Stop reason: SDUPTHER

## 2024-03-18 NOTE — TELEPHONE ENCOUNTER
She would like another script for Fluconazole for a yeast infection. She has had a yeast infection for 3 days,    [facility]

## 2024-03-22 ENCOUNTER — TELEPHONE (OUTPATIENT)
Dept: PRIMARY CARE | Facility: CLINIC | Age: 54
End: 2024-03-22
Payer: COMMERCIAL

## 2024-03-22 DIAGNOSIS — B37.9 YEAST INFECTION: ICD-10-CM

## 2024-03-22 RX ORDER — FLUCONAZOLE 150 MG/1
150 TABLET ORAL ONCE
Qty: 1 TABLET | Refills: 1 | Status: SHIPPED | OUTPATIENT
Start: 2024-03-22 | End: 2024-03-22

## 2024-03-28 ENCOUNTER — HOSPITAL ENCOUNTER (OUTPATIENT)
Dept: RADIOLOGY | Facility: HOSPITAL | Age: 54
Discharge: HOME | End: 2024-03-28
Payer: COMMERCIAL

## 2024-03-28 VITALS — BODY MASS INDEX: 29.73 KG/M2 | HEIGHT: 66 IN | WEIGHT: 185 LBS

## 2024-03-28 DIAGNOSIS — Z12.31 VISIT FOR SCREENING MAMMOGRAM: ICD-10-CM

## 2024-03-28 PROCEDURE — 77067 SCR MAMMO BI INCL CAD: CPT | Performed by: RADIOLOGY

## 2024-03-28 PROCEDURE — 77067 SCR MAMMO BI INCL CAD: CPT

## 2024-03-28 PROCEDURE — 77063 BREAST TOMOSYNTHESIS BI: CPT | Performed by: RADIOLOGY

## 2024-03-29 ENCOUNTER — TELEPHONE (OUTPATIENT)
Dept: PRIMARY CARE | Facility: CLINIC | Age: 54
End: 2024-03-29
Payer: COMMERCIAL

## 2024-03-29 NOTE — TELEPHONE ENCOUNTER
----- Message from RENE Buckley-CNS sent at 3/28/2024  6:51 PM EDT -----  Please let patient know that her Mammogram is negative. Thank you!

## 2024-04-12 ENCOUNTER — TELEPHONE (OUTPATIENT)
Dept: PRIMARY CARE | Facility: CLINIC | Age: 54
End: 2024-04-12
Payer: COMMERCIAL

## 2024-04-12 DIAGNOSIS — B37.9 YEAST INFECTION: Primary | ICD-10-CM

## 2024-04-12 DIAGNOSIS — E11.65 TYPE 2 DIABETES MELLITUS WITH HYPERGLYCEMIA, WITHOUT LONG-TERM CURRENT USE OF INSULIN (MULTI): ICD-10-CM

## 2024-04-12 RX ORDER — FLUCONAZOLE 150 MG/1
150 TABLET ORAL ONCE
Qty: 1 TABLET | Refills: 0 | Status: SHIPPED | OUTPATIENT
Start: 2024-04-12 | End: 2024-04-12

## 2024-04-12 NOTE — TELEPHONE ENCOUNTER
voicemail  Patient states jardiance was only filled for a 30 day supply no refills. Patient states she normally gets a 90 day with refills insurance will not refill unilt Dena she doesn't know why. Would like this looked into.

## 2024-04-12 NOTE — TELEPHONE ENCOUNTER
Patient also complaining of Yeast infection but as been told to schedule with OB as this is a on going issues. Has not made that apt yet. I spoke with Kaitlin she will send Diflucan over this time but she need to make an apt with OB. Patient has been notified and will call to make an apt

## 2024-04-12 NOTE — TELEPHONE ENCOUNTER
Medication was sent over to the Pharmacy for #90 with 3 refills. If they are only filling for 30 days could be due to insurance wants her to do a mail order. Patient will contact insurance.

## 2024-05-08 ENCOUNTER — TELEPHONE (OUTPATIENT)
Dept: OBSTETRICS AND GYNECOLOGY | Facility: CLINIC | Age: 54
End: 2024-05-08
Payer: COMMERCIAL

## 2024-05-08 DIAGNOSIS — N89.8 VAGINAL ITCHING: Primary | ICD-10-CM

## 2024-05-08 RX ORDER — FLUCONAZOLE 150 MG/1
150 TABLET ORAL
Qty: 3 TABLET | Refills: 0 | Status: SHIPPED | OUTPATIENT
Start: 2024-05-08 | End: 2024-05-15

## 2024-05-08 NOTE — TELEPHONE ENCOUNTER
Is this something you are willing to do or would you like her to wait for Jesse to return to office?

## 2024-05-16 ENCOUNTER — HOSPITAL ENCOUNTER (OUTPATIENT)
Dept: RADIOLOGY | Facility: HOSPITAL | Age: 54
Discharge: HOME | End: 2024-05-16
Payer: COMMERCIAL

## 2024-05-16 DIAGNOSIS — E11.9 TYPE 2 DIABETES MELLITUS WITHOUT COMPLICATIONS (MULTI): ICD-10-CM

## 2024-05-16 PROCEDURE — 75571 CT HRT W/O DYE W/CA TEST: CPT

## 2024-05-20 ENCOUNTER — TELEPHONE (OUTPATIENT)
Dept: PRIMARY CARE | Facility: CLINIC | Age: 54
End: 2024-05-20
Payer: COMMERCIAL

## 2024-05-20 NOTE — TELEPHONE ENCOUNTER
----- Message from RENE Buckley-CNS sent at 5/19/2024  9:21 PM EDT -----  Please let patient know that CT Cardiac Scoring is 0. Thank you!

## 2024-05-21 ENCOUNTER — OFFICE VISIT (OUTPATIENT)
Dept: OBSTETRICS AND GYNECOLOGY | Facility: CLINIC | Age: 54
End: 2024-05-21
Payer: COMMERCIAL

## 2024-05-21 VITALS
DIASTOLIC BLOOD PRESSURE: 82 MMHG | HEIGHT: 66 IN | SYSTOLIC BLOOD PRESSURE: 162 MMHG | WEIGHT: 183 LBS | BODY MASS INDEX: 29.41 KG/M2

## 2024-05-21 DIAGNOSIS — Z01.419 ENCOUNTER FOR WELL WOMAN EXAM WITH ROUTINE GYNECOLOGICAL EXAM: Primary | ICD-10-CM

## 2024-05-21 DIAGNOSIS — Z12.31 ENCOUNTER FOR SCREENING MAMMOGRAM FOR MALIGNANT NEOPLASM OF BREAST: ICD-10-CM

## 2024-05-21 DIAGNOSIS — N89.8 VAGINAL DISCHARGE: ICD-10-CM

## 2024-05-21 DIAGNOSIS — Z11.51 SCREENING FOR HPV (HUMAN PAPILLOMAVIRUS): ICD-10-CM

## 2024-05-21 DIAGNOSIS — N63.22 MASS OF UPPER INNER QUADRANT OF LEFT BREAST: ICD-10-CM

## 2024-05-21 DIAGNOSIS — Z12.4 SCREENING FOR CERVICAL CANCER: ICD-10-CM

## 2024-05-21 PROCEDURE — 3077F SYST BP >= 140 MM HG: CPT | Performed by: NURSE PRACTITIONER

## 2024-05-21 PROCEDURE — 3079F DIAST BP 80-89 MM HG: CPT | Performed by: NURSE PRACTITIONER

## 2024-05-21 PROCEDURE — 99386 PREV VISIT NEW AGE 40-64: CPT | Performed by: NURSE PRACTITIONER

## 2024-05-21 PROCEDURE — 88175 CYTOPATH C/V AUTO FLUID REDO: CPT

## 2024-05-21 PROCEDURE — 87624 HPV HI-RISK TYP POOLED RSLT: CPT

## 2024-05-21 RX ORDER — PHENAZOPYRIDINE HYDROCHLORIDE 95 MG/1
95 TABLET ORAL 3 TIMES DAILY PRN
COMMUNITY

## 2024-05-21 RX ORDER — FLUCONAZOLE 150 MG/1
150 TABLET ORAL ONCE
Qty: 2 TABLET | Refills: 6 | Status: SHIPPED | OUTPATIENT
Start: 2024-05-21 | End: 2024-05-21

## 2024-05-21 NOTE — PROGRESS NOTES
"     HPI:   Rox Munoz is a 53 y.o. who presents today for her annual gynecologic exam with complaints    She has the following concerns; having recurrent yeast infections. Hx of diabetes. On jardiance. Feels this is causing her recurrent yeast infections; however she does not want to stop this medication as she feels it is controlling her blood sugar.  She is using boric acid as needed.     GYN HISTORY:  Denies any postmenopausal bleeding.     PAP History   Due for PAP.   History of abnormal pap: no  HPV vaccine: no  @paphx@    Health Screening  Family history of breast, uterine, ovarian or colon cancer: no   Breast cancer: mammogram - done in March 2024; Cat. 1.  Colon cancer: Per PCP.       The patient feels safe at home.         Review of Systems:   Constitutional: no fever and no chills.  Cardiovascular: no chest pain.   Respiratory: no shortness of breath.   Gastrointestinal: no nausea, no abdominal pain and no constipation  Genitourinary: no dysuria, no urinary incontinence, no vaginal dryness, no pelvic pain and no vaginal discharge.   Neurological: no headache.  Psychiatric: no anxiety and no depression.              Objective         /82   Ht 1.664 m (5' 5.5\")   Wt 83 kg (183 lb)   BMI 29.99 kg/m²         Physical Exam:   Constitutional: Alert and in no acute distress. Well developed, well nourished.      Neck: No neck asymmetry. Supple. Thyroid not enlarged and there were no palpable thyroid nodules.      Cardiovascular: Heart rate and rhythm were normal, normal S1 and S2, no gallops, and no murmurs.      Pulmonary: No respiratory distress. Clear bilateral breath sounds.      Chest: Breasts: Normal appearance, no nipple discharge and no skin changes. Palpation of breasts and axillae: No palpable mass and no axillary lymphadenopathy. + left breast mass, upper inner quadrant.      Abdomen: Soft nontender; no abdominal mass palpated. Normal bowel sounds. No organomegaly.      Genitourinary:   - " External genitalia: Normal.   - Palpation of lymph nodes in groin: No inguinal lymphadenopathy.   - Bartholin's Urethral and Skenes Glands: Normal.   - Urethra: Normal.    -Bladder: Normal on palpation.   - Vagina: + erythema, thick, white discharge.   - Cervix: Normal.   - Uterus: Normal. Right Adnexa/parametria: Normal. Left Adnexa/parametria: Normal.   - Perianal Area: Normal.      Skin: Normal skin color and pigmentation, normal skin turgor, and no rash     Psychiatric: Alert and oriented x 3. Affect normal to patient baseline. Mood: Appropriate.            Assessment/Plan       Diagnoses and all orders for this visit:  Encounter for well woman exam with routine gynecological exam  Here for well woman exam. She c/o recurrent yeast infections and on exam, has a breast lump in the left breast. PAP obtained.    Encounter for screening mammogram for malignant neoplasm of breast  -     BI mammo bilateral screening tomosynthesis; Future  Vaginal discharge  -     fluconazole (Diflucan) 150 mg tablet; Take 1 tablet (150 mg) by mouth 1 time for 1 dose. Repeat in 7 days if symptoms persist.  Mass of upper inner quadrant of left breast  -     BI US breast limited left; Future  -     BI mammo left diagnostic tomosynthesis; Future  Screening for cervical cancer  -     THINPREP PAP TEST (>30)  -     HPV DNA High Risk With Genotype  Screening for HPV (human papillomavirus)  -     THINPREP PAP TEST (>30)  -     HPV DNA High Risk With Genotype  Follow-up annually; sooner if needed.       Taina Pitts, APRN-CNP

## 2024-05-31 DIAGNOSIS — E11.9 TYPE 2 DIABETES MELLITUS WITHOUT COMPLICATIONS (MULTI): ICD-10-CM

## 2024-05-31 RX ORDER — METFORMIN HYDROCHLORIDE 500 MG/1
TABLET ORAL
Qty: 360 TABLET | Refills: 1 | Status: SHIPPED | OUTPATIENT
Start: 2024-05-31

## 2024-06-02 PROBLEM — Z01.419 ENCOUNTER FOR WELL WOMAN EXAM WITH ROUTINE GYNECOLOGICAL EXAM: Status: ACTIVE | Noted: 2024-06-02

## 2024-06-02 NOTE — ASSESSMENT & PLAN NOTE
Here for well woman exam. She c/o recurrent yeast infections and on exam, has a breast lump in the left breast. PAP obtained.

## 2024-06-04 LAB
CYTOLOGY CMNT CVX/VAG CYTO-IMP: NORMAL
HPV HR 12 DNA GENITAL QL NAA+PROBE: NEGATIVE
HPV HR GENOTYPES PNL CVX NAA+PROBE: NEGATIVE
HPV16 DNA SPEC QL NAA+PROBE: NEGATIVE
HPV18 DNA SPEC QL NAA+PROBE: NEGATIVE
LAB AP HPV GENOTYPE QUESTION: YES
LAB AP HPV HR: NORMAL
LABORATORY COMMENT REPORT: NORMAL
PATH REPORT.TOTAL CANCER: NORMAL

## 2024-06-06 ENCOUNTER — HOSPITAL ENCOUNTER (OUTPATIENT)
Dept: RADIOLOGY | Facility: HOSPITAL | Age: 54
Discharge: HOME | End: 2024-06-06
Payer: COMMERCIAL

## 2024-06-06 VITALS — WEIGHT: 185 LBS | BODY MASS INDEX: 29.73 KG/M2 | HEIGHT: 66 IN

## 2024-06-06 DIAGNOSIS — N63.22 MASS OF UPPER INNER QUADRANT OF LEFT BREAST: ICD-10-CM

## 2024-06-06 DIAGNOSIS — R92.8 ABNORMAL FINDINGS ON DIAGNOSTIC IMAGING OF BREAST: Primary | ICD-10-CM

## 2024-06-06 DIAGNOSIS — N63.20 LEFT BREAST MASS: ICD-10-CM

## 2024-06-06 PROCEDURE — 76982 USE 1ST TARGET LESION: CPT | Mod: LT

## 2024-06-06 PROCEDURE — 76642 ULTRASOUND BREAST LIMITED: CPT | Mod: LEFT SIDE

## 2024-06-06 PROCEDURE — 77061 BREAST TOMOSYNTHESIS UNI: CPT | Mod: LEFT SIDE

## 2024-06-06 PROCEDURE — 77061 BREAST TOMOSYNTHESIS UNI: CPT | Mod: LT

## 2024-06-06 PROCEDURE — 77065 DX MAMMO INCL CAD UNI: CPT | Mod: LEFT SIDE

## 2024-06-06 PROCEDURE — 76642 ULTRASOUND BREAST LIMITED: CPT | Mod: LT

## 2024-06-06 NOTE — Clinical Note
Your patient Rox Munoz seen for diagnostic breast imaging today will have final results available for your review shortly. I assisted with follow up scheduling and education review today. She will see Dr. Jimenez for consultation 6/10 1015AM, with biopsy to follow also 6/10 at 1130AM.

## 2024-06-06 NOTE — NURSING NOTE
After patient review of diagnostic results with Dr. Casillas, support provided. Written literature regarding abnormal breast imaging and breast biopsy including what to expect before, during, and after the procedure reviewed with the patient. All questions answered. Patient selected Dr. Jimenez for surgical consultation 6/10 1015AM with biopsy to follow 6/10 1130AM. Information provided and reviewed to include provider information and how to reach me directly with questions or concerns before concluding visit.

## 2024-06-10 ENCOUNTER — OFFICE VISIT (OUTPATIENT)
Dept: SURGERY | Facility: CLINIC | Age: 54
End: 2024-06-10
Payer: COMMERCIAL

## 2024-06-10 ENCOUNTER — HOSPITAL ENCOUNTER (OUTPATIENT)
Dept: RADIOLOGY | Facility: HOSPITAL | Age: 54
Discharge: HOME | End: 2024-06-10
Payer: COMMERCIAL

## 2024-06-10 VITALS
OXYGEN SATURATION: 96 % | HEIGHT: 66 IN | BODY MASS INDEX: 30.31 KG/M2 | WEIGHT: 188.6 LBS | DIASTOLIC BLOOD PRESSURE: 84 MMHG | HEART RATE: 79 BPM | SYSTOLIC BLOOD PRESSURE: 146 MMHG

## 2024-06-10 DIAGNOSIS — N63.20 MASS OF LEFT BREAST, UNSPECIFIED QUADRANT: Primary | ICD-10-CM

## 2024-06-10 DIAGNOSIS — R92.8 ABNORMAL FINDINGS ON DIAGNOSTIC IMAGING OF BREAST: ICD-10-CM

## 2024-06-10 DIAGNOSIS — R19.00 ABDOMINAL WALL BULGE: ICD-10-CM

## 2024-06-10 DIAGNOSIS — N63.20 LEFT BREAST MASS: ICD-10-CM

## 2024-06-10 PROCEDURE — A4648 IMPLANTABLE TISSUE MARKER: HCPCS

## 2024-06-10 PROCEDURE — 2720000007 HC OR 272 NO HCPCS

## 2024-06-10 PROCEDURE — 99204 OFFICE O/P NEW MOD 45 MIN: CPT | Performed by: SURGERY

## 2024-06-10 PROCEDURE — 19083 BX BREAST 1ST LESION US IMAG: CPT | Mod: LT

## 2024-06-10 PROCEDURE — 3079F DIAST BP 80-89 MM HG: CPT | Performed by: SURGERY

## 2024-06-10 PROCEDURE — 77065 DX MAMMO INCL CAD UNI: CPT | Mod: LT

## 2024-06-10 PROCEDURE — 3077F SYST BP >= 140 MM HG: CPT | Performed by: SURGERY

## 2024-06-10 ASSESSMENT — ENCOUNTER SYMPTOMS
BLOOD IN STOOL: 0
PALPITATIONS: 0
DIARRHEA: 0
CONSTIPATION: 0
NAUSEA: 0
COUGH: 0
HEADACHES: 0
CHILLS: 0
VOMITING: 0
FEVER: 0
SHORTNESS OF BREATH: 0
DIZZINESS: 0
ABDOMINAL PAIN: 0

## 2024-06-10 NOTE — PROGRESS NOTES
GENERAL SURGERY CLINIC NOTE    Rox Munoz   1970   [id number]     History Of Present Illness  Rox Munoz is a 53 y.o. female who presents to the office for evaluation of bilateral breast masses. She underwent a screening mammogram 3/28/24 that was benign. She was developing recurrent yeast infections and saw a gynecologist recently, who palpated bilateral breast masses (medial left and medial right breasts). An US showed a 1.5cm spiculated breast at 9:00 and the patient is undergoing a biopsy today.    She also has noticed an abdominal wall bulging by her umbilicus for a few months.     She underwent menarche at 10 and menopause in her early 40s. She had no pregnancies. She took OCPs for 20 years, HRT for 3 years, and now takes OTC meds to manage hot flash symptoms.     Past Medical History  She has a past medical history of Abnormal levels of other serum enzymes, Acute sinusitis, unspecified (03/15/2021), Other conditions influencing health status (12/04/2019), Other specified abnormal findings of blood chemistry, Personal history of other diseases of the digestive system (03/15/2021), Personal history of other diseases of the musculoskeletal system and connective tissue, Personal history of other diseases of the respiratory system (12/06/2022), Personal history of other infectious and parasitic diseases, Persons encountering health services in other specified circumstances (09/01/2022), Right lower quadrant pain (12/08/2016), Right upper quadrant pain (12/28/2016), and Tinea pedis (09/01/2022).    Surgical History  She has a past surgical history that includes Foot surgery (10/27/2016).  No abdominal surgeries    Medications  Current Outpatient Medications on File Prior to Visit   Medication Sig Dispense Refill    cholecalciferol (Vitamin D-3) 125 MCG (5000 UT) capsule TAKE AS DIRECTED. 90 capsule 3    citalopram (CeleXA) 40 mg tablet TAKE 1 TABLET BY MOUTH EVERY DAY 90 tablet 3    gabapentin  (Neurontin) 300 mg capsule Take 1 capsule (300 mg) by mouth 2 times a day. 180 capsule 1    glimepiride (Amaryl) 2 mg tablet Take 1 tablet (2 mg) by mouth 2 times a day with meals. 180 tablet 1    Jardiance 25 mg TAKE 1 TABLET BY MOUTH EVERY DAY 90 tablet 2    L. reuteri/L. rhamnosus (REPHRESH PRO-B ORAL) Take by mouth.      lancets 33 gauge misc       metFORMIN (Glucophage) 500 mg tablet TAKE 2 TABLETS BY MOUTH TWICE A  tablet 1    mv-mn/FA/bl coh/isoflav/jujube (ESTROVEN MENOPAUSE ORAL) Take by mouth.      omeprazole (PriLOSEC) 40 mg DR capsule TAKE 1 CAPSULE BY MOUTH EVERY DAY 90 capsule 3    OneTouch Ultra Test strip TEST TWICE DAILY.      OXYQUINOLINE-BORIC ACID VAGL Insert into the vagina.      phenazopyridine (Urinary Pain Relief) 95 mg tablet Take 1 tablet (95 mg) by mouth 3 times a day as needed for bladder spasms.      busPIRone (Buspar) 7.5 mg tablet TAKE 1 TABLET TWICE A DAY (Patient not taking: Reported on 6/10/2024) 180 tablet 3     No current facility-administered medications on file prior to visit.       Allergies  Bactrim [sulfamethoxazole-trimethoprim], Doxycycline, Penicillins, and Sulfacetamide sodium-sulfur     Social History  She reports that she has been smoking cigarettes. She has a 12 pack-year smoking history. She has never used smokeless tobacco. She reports current alcohol use. She reports that she does not use drugs.    Family History  Family History   Problem Relation Name Age of Onset    Depression Father      Depression Other Aunt    No known fhx malignancy      Review of Systems   Constitutional:  Negative for chills and fever.   Respiratory:  Negative for cough and shortness of breath.    Cardiovascular:  Negative for chest pain and palpitations.   Gastrointestinal:  Negative for abdominal pain, blood in stool, constipation, diarrhea, nausea and vomiting.   Neurological:  Negative for dizziness and headaches.   All other systems reviewed and are negative.      Last Recorded  "Vitals  Blood pressure 146/84, pulse 79, height 1.676 m (5' 6\"), weight 85.5 kg (188 lb 9.6 oz), SpO2 96%.     Physical Exam  Constitutional:       General: She is not in acute distress.     Appearance: Normal appearance. She is not ill-appearing.   HENT:      Head: Normocephalic and atraumatic.   Cardiovascular:      Rate and Rhythm: Normal rate and regular rhythm.   Pulmonary:      Effort: Pulmonary effort is normal. No respiratory distress.      Breath sounds: Normal breath sounds.   Chest:   Breasts:     Right: Normal. No mass, nipple discharge, skin change or tenderness.      Left: Mass present. No nipple discharge, skin change or tenderness.          Comments: L breast: in marked area, there is probably a 3x1.5cm mass, larger than what imaging showed. Overlying abrasion from marker used during examination  R breast: unable to palpate a mass in the area marked  Abdominal:      General: There is no distension.      Palpations: Abdomen is soft.      Tenderness: There is no abdominal tenderness. There is no guarding.      Comments: No significant bulging at umbilicus. Unable to clearly palpate a fascial defect   Musculoskeletal:         General: No swelling.   Lymphadenopathy:      Upper Body:      Right upper body: No supraclavicular, axillary or pectoral adenopathy.      Left upper body: No supraclavicular, axillary or pectoral adenopathy.   Skin:     General: Skin is warm and dry.   Neurological:      Mental Status: She is alert and oriented to person, place, and time. Mental status is at baseline.   Psychiatric:         Mood and Affect: Mood normal.         Behavior: Behavior normal.          Relevant Results    BI mammo left diagnostic tomosynthesis    Mass corresponding to the area of palpable concern is seen only by ultrasound. Biopsy is recommended.   BI-RADS CATEGORY:   BI-RADS Category:  4 Suspicious. Recommendation:  Surgical Consultation and Biopsy. Recommended Date:  Immediate. Laterality:  Left.   " For any future breast imaging appointments, please call [phone]).     MACRO: None   Signed by: Deisy Casillas 6/6/2024 1:14 PM Dictation workstation:   [id number]    BI US breast limited left    Mass corresponding to the area of palpable concern is seen only by ultrasound. Biopsy is recommended.   BI-RADS CATEGORY:   BI-RADS Category:  4 Suspicious. Recommendation:  Surgical Consultation and Biopsy. Recommended Date:  Immediate. Laterality:  Left.   For any future breast imaging appointments, please call [phone]).     MACRO: None   Signed by: Deisy Casillas 6/6/2024 1:14 PM Dictation workstation:   [id number]    Assessment and Plan  53 y.o. female with a spiculated, 1.5cm left breast mass in the setting of dense breast tissue and is undergoing a biopsy today. I will call her with the initial biopsy results. As she has dense breast tissue and the mass was a bit larger on examination than imaging, I recommend undergoing a breast MRI. It will help determine the extent of the left breast mass and whether there is truly a right breast mass as well.     For her possible umbilical hernia that I was unable to palpate on examination today, I recommend undergoing a noncontrasted CT scan.    Follow up with me 1 week after the MRI breast and CT abdomen to discuss the results and next steps. She had a few polyps on her colonoscopy with me in 2020. She is due for colonoscopy in 2025.    She expressed her understanding and all questions were answered.    Leora Jimenez MD, [facility]  General Surgery

## 2024-06-13 ENCOUNTER — TELEPHONE (OUTPATIENT)
Dept: SURGERY | Facility: CLINIC | Age: 54
End: 2024-06-13
Payer: COMMERCIAL

## 2024-06-13 NOTE — TELEPHONE ENCOUNTER
Patient called asking about breast biopsy results 06/10/24. The surgical pathology results are final. Patient would like a call back to go over results.

## 2024-06-14 LAB
LABORATORY COMMENT REPORT: NORMAL
PATH REPORT.ADDENDUM SPEC: NORMAL
PATH REPORT.FINAL DX SPEC: NORMAL
PATH REPORT.GROSS SPEC: NORMAL
PATH REPORT.RELEVANT HX SPEC: NORMAL
PATH REPORT.TOTAL CANCER: NORMAL

## 2024-06-19 ENCOUNTER — CLINICAL SUPPORT (OUTPATIENT)
Dept: PRIMARY CARE | Facility: CLINIC | Age: 54
End: 2024-06-19
Payer: COMMERCIAL

## 2024-06-19 DIAGNOSIS — T78.40XS ALLERGY, SEQUELA: ICD-10-CM

## 2024-06-19 DIAGNOSIS — G89.4 CHRONIC PAIN SYNDROME: ICD-10-CM

## 2024-06-19 PROCEDURE — 96372 THER/PROPH/DIAG INJ SC/IM: CPT | Performed by: CLINICAL NURSE SPECIALIST

## 2024-06-19 RX ORDER — KETOROLAC TROMETHAMINE 30 MG/ML
30 INJECTION, SOLUTION INTRAMUSCULAR; INTRAVENOUS ONCE
Status: COMPLETED | OUTPATIENT
Start: 2024-06-19 | End: 2024-06-19

## 2024-06-19 RX ORDER — TRIAMCINOLONE ACETONIDE 40 MG/ML
40 INJECTION, SUSPENSION INTRA-ARTICULAR; INTRAMUSCULAR ONCE
Status: COMPLETED | OUTPATIENT
Start: 2024-06-19 | End: 2024-06-19

## 2024-06-26 ENCOUNTER — HOSPITAL ENCOUNTER (OUTPATIENT)
Dept: RADIOLOGY | Facility: HOSPITAL | Age: 54
Discharge: HOME | End: 2024-06-26
Payer: COMMERCIAL

## 2024-06-26 ENCOUNTER — APPOINTMENT (OUTPATIENT)
Dept: RADIOLOGY | Facility: HOSPITAL | Age: 54
End: 2024-06-26
Payer: COMMERCIAL

## 2024-06-26 DIAGNOSIS — N63.20 MASS OF LEFT BREAST, UNSPECIFIED QUADRANT: ICD-10-CM

## 2024-06-26 DIAGNOSIS — R19.00 ABDOMINAL WALL BULGE: ICD-10-CM

## 2024-06-26 PROCEDURE — 2550000001 HC RX 255 CONTRASTS: Performed by: SURGERY

## 2024-06-26 PROCEDURE — 77049 MRI BREAST C-+ W/CAD BI: CPT

## 2024-06-26 PROCEDURE — A9575 INJ GADOTERATE MEGLUMI 0.1ML: HCPCS | Performed by: SURGERY

## 2024-06-26 PROCEDURE — 74176 CT ABD & PELVIS W/O CONTRAST: CPT

## 2024-06-26 RX ORDER — GADOTERATE MEGLUMINE 376.9 MG/ML
0.2 INJECTION INTRAVENOUS
Status: COMPLETED | OUTPATIENT
Start: 2024-06-26 | End: 2024-06-26

## 2024-06-28 ENCOUNTER — OFFICE VISIT (OUTPATIENT)
Dept: SURGERY | Facility: CLINIC | Age: 54
End: 2024-06-28
Payer: COMMERCIAL

## 2024-06-28 VITALS
OXYGEN SATURATION: 97 % | SYSTOLIC BLOOD PRESSURE: 159 MMHG | DIASTOLIC BLOOD PRESSURE: 87 MMHG | WEIGHT: 181.8 LBS | HEART RATE: 88 BPM | BODY MASS INDEX: 29.22 KG/M2 | HEIGHT: 66 IN

## 2024-06-28 DIAGNOSIS — C50.919 INVASIVE LOBULAR CARCINOMA OF BREAST IN FEMALE (MULTI): Primary | ICD-10-CM

## 2024-06-28 DIAGNOSIS — K42.9 UMBILICAL HERNIA WITHOUT OBSTRUCTION AND WITHOUT GANGRENE: ICD-10-CM

## 2024-06-28 PROCEDURE — 3079F DIAST BP 80-89 MM HG: CPT | Performed by: SURGERY

## 2024-06-28 PROCEDURE — 99215 OFFICE O/P EST HI 40 MIN: CPT | Performed by: SURGERY

## 2024-06-28 PROCEDURE — 3077F SYST BP >= 140 MM HG: CPT | Performed by: SURGERY

## 2024-06-28 ASSESSMENT — ENCOUNTER SYMPTOMS
BLOOD IN STOOL: 0
PALPITATIONS: 0
CONSTIPATION: 0
HEADACHES: 0
CHILLS: 0
ABDOMINAL PAIN: 0
DIARRHEA: 0
NAUSEA: 0
VOMITING: 0
SHORTNESS OF BREATH: 0
FEVER: 0
COUGH: 0
DIZZINESS: 0

## 2024-06-28 NOTE — PROGRESS NOTES
GENERAL SURGERY CLINIC NOTE    Rox Munoz   1970   [id number]     History Of Present Illness  Rox Munoz is a 53 y.o. female who presents to the office for follow up of bilateral breast masses after undergoing biopsy and breast MRI. She underwent a screening mammogram 3/28/24 that was benign. She developed recurrent yeast infections and saw a gynecologist recently, who palpated bilateral breast masses (medial left and medial right breasts). An US showed a 1.5cm spiculated mass in the left breast at 9:00. She healed well following the biopsy.     She also has noticed an abdominal wall bulging by her umbilicus for a few months and underwent CT recently.     She underwent menarche at 10 and menopause in her early 40s. She had no pregnancies. She took OCPs for 20 years, HRT for 3 years, and now takes OTC meds to manage hot flash symptoms.     Past Medical History  She has a past medical history of Abnormal levels of other serum enzymes, Acute sinusitis, unspecified (03/15/2021), Other conditions influencing health status (12/04/2019), Other specified abnormal findings of blood chemistry, Personal history of other diseases of the digestive system (03/15/2021), Personal history of other diseases of the musculoskeletal system and connective tissue, Personal history of other diseases of the respiratory system (12/06/2022), Personal history of other infectious and parasitic diseases, Persons encountering health services in other specified circumstances (09/01/2022), Right lower quadrant pain (12/08/2016), Right upper quadrant pain (12/28/2016), and Tinea pedis (09/01/2022).    Surgical History  She has a past surgical history that includes Foot surgery (10/27/2016).  No abdominal surgeries    Medications  Current Outpatient Medications on File Prior to Visit   Medication Sig Dispense Refill    busPIRone (Buspar) 7.5 mg tablet TAKE 1 TABLET TWICE A  tablet 3    cholecalciferol (Vitamin D-3) 125 MCG (5000  UT) capsule TAKE AS DIRECTED. 90 capsule 3    citalopram (CeleXA) 40 mg tablet TAKE 1 TABLET BY MOUTH EVERY DAY 90 tablet 3    gabapentin (Neurontin) 300 mg capsule Take 1 capsule (300 mg) by mouth 2 times a day. 180 capsule 1    glimepiride (Amaryl) 2 mg tablet Take 1 tablet (2 mg) by mouth 2 times a day with meals. 180 tablet 1    Jardiance 25 mg TAKE 1 TABLET BY MOUTH EVERY DAY 90 tablet 2    L. reuteri/L. rhamnosus (REPHRESH PRO-B ORAL) Take by mouth.      lancets 33 gauge misc       metFORMIN (Glucophage) 500 mg tablet TAKE 2 TABLETS BY MOUTH TWICE A  tablet 1    mv-mn/FA/bl coh/isoflav/jujube (ESTROVEN MENOPAUSE ORAL) Take by mouth.      omeprazole (PriLOSEC) 40 mg DR capsule TAKE 1 CAPSULE BY MOUTH EVERY DAY 90 capsule 3    OneTouch Ultra Test strip TEST TWICE DAILY.      OXYQUINOLINE-BORIC ACID VAGL Insert into the vagina.      phenazopyridine (Urinary Pain Relief) 95 mg tablet Take 1 tablet (95 mg) by mouth 3 times a day as needed for bladder spasms.       No current facility-administered medications on file prior to visit.       Allergies  Bactrim [sulfamethoxazole-trimethoprim], Doxycycline, Penicillins, and Sulfacetamide sodium-sulfur     Social History  She reports that she has been smoking cigarettes. She has a 12 pack-year smoking history. She has never used smokeless tobacco. She reports current alcohol use. She reports that she does not use drugs.    Family History  Family History   Problem Relation Name Age of Onset    Depression Father      Depression Other Aunt    No known fhx malignancy      Review of Systems   Constitutional:  Negative for chills and fever.   Respiratory:  Negative for cough and shortness of breath.    Cardiovascular:  Negative for chest pain and palpitations.   Gastrointestinal:  Negative for abdominal pain, blood in stool, constipation, diarrhea, nausea and vomiting.   Neurological:  Negative for dizziness and headaches.   All other systems reviewed and are  "negative.      Last Recorded Vitals  Blood pressure 159/87, pulse 88, height 1.676 m (5' 6\"), weight 82.5 kg (181 lb 12.8 oz), SpO2 97%.     Physical Exam  Constitutional:       General: She is not in acute distress.     Appearance: Normal appearance. She is not ill-appearing.   HENT:      Head: Normocephalic and atraumatic.   Cardiovascular:      Rate and Rhythm: Normal rate and regular rhythm.   Pulmonary:      Effort: Pulmonary effort is normal. No respiratory distress.      Breath sounds: Normal breath sounds.   Chest:   Breasts:     Right: Normal. No mass, nipple discharge, skin change or tenderness.      Left: Mass present. No nipple discharge, skin change or tenderness.          Comments: L breast: in marked area, there is probably a 3x1.5cm mass, larger than what imaging showed  Abdominal:      General: There is no distension.      Palpations: Abdomen is soft.      Tenderness: There is no abdominal tenderness. There is no guarding.      Comments: No significant bulging at umbilicus. Unable to clearly palpate a fascial defect   Musculoskeletal:         General: No swelling.   Lymphadenopathy:      Upper Body:      Right upper body: No supraclavicular, axillary or pectoral adenopathy.      Left upper body: No supraclavicular, axillary or pectoral adenopathy.   Skin:     General: Skin is warm and dry.   Neurological:      Mental Status: She is alert and oriented to person, place, and time. Mental status is at baseline.   Psychiatric:         Mood and Affect: Mood normal.         Behavior: Behavior normal.          Relevant Results    Pathology results reviewed    MR breast bilateral w contrast full protocol    Irregular linear nonmass enhancement corresponding to the known left breast malignancy. No additional left breast enhancement. No axillary or internal mammary lymphadenopathy.   No MRI evidence of malignancy in the right breast.   BI-RADS CATEGORY: BI-RADS CATEGORY:  6 Known Biopsy-Proven Malignancy. " Recommendation:  Immediate Follow-up. Recommended Date:  Immediate. Laterality:  Left.   For any future breast imaging appointments, please call [phone]).     MACRO: None   Signed by: Tamie Mckee 6/26/2024 1:06 PM Dictation workstation:   [id number]    CT abdomen pelvis wo IV contrast    Small fat containing umbilical/paraumbilical hernia which is slightly increased in size when compared to the previous study 07/23/2019. Small fat containing inguinal hernias which are not significantly changed in size. Additional unchanged findings as detailed above.   MACRO: None.   Signed by: Milo Banks 6/26/2024 9:48 AM Dictation workstation:   [id number]    BI mammo left diagnostic tomosynthesis    Mass corresponding to the area of palpable concern is seen only by ultrasound. Biopsy is recommended.   BI-RADS CATEGORY:   BI-RADS Category:  4 Suspicious. Recommendation:  Surgical Consultation and Biopsy. Recommended Date:  Immediate. Laterality:  Left.   For any future breast imaging appointments, please call [phone]).     MACRO: None   Signed by: Deisy Casillas 6/6/2024 1:14 PM Dictation workstation:   [id number]    BI US breast limited left    Mass corresponding to the area of palpable concern is seen only by ultrasound. Biopsy is recommended.   BI-RADS CATEGORY:   BI-RADS Category:  4 Suspicious. Recommendation:  Surgical Consultation and Biopsy. Recommended Date:  Immediate. Laterality:  Left.   For any future breast imaging appointments, please call [phone]).     MACRO: None   Signed by: Deisy Casillas 6/6/2024 1:14 PM Dictation workstation:   [id number]    Assessment and Plan  53 y.o. female with a spiculated left breast mass in the setting of dense breast tissue. I reviewed the results of the recent studies with her. She has a grade 2 invasive lobular carcinoma (ER+, OK-, Her2-) in her left breast that is far larger on MRI (6.2x4cm) than seen on US/mammogram (1.5cm). There is no  clinical evidence of lymphadenopathy. Currently, she is at least a T3. I discussed treatment options, which include a subset of surgery, radiation, endocrine therapy, and chemotherapy. If the patient is interested in breast conservation surgery, she will likely have to be considered for neoadjuvant chemotherapy, followed by imaging to determine whether the cancer has shrunk enough in size to proceed with lumpectomy. If the patient prefers to undergo mastectomy, she would also have to decide whether to have reconstruction completed at the same time. In any case, she will also need at least a sentinel lymph node biopsy at the time of her breast surgery. The patient has made an appointment with a breast surgeon at [facility] next week to discuss the results and options. We will send over the relevant results.     The patient's umbilical hernia is small on imaging and asymptomatic. Surgery is not recommended unless it becomes larger or painful.     Her next colonoscopy is due in 2025 due to a history of polyps on her last colonoscopy in 2020.    Follow up with me on an as needed basis. She and her  expressed their understanding and all questions were answered.    Leora Jimenez MD, FACS  General Surgery

## 2024-07-01 ENCOUNTER — APPOINTMENT (OUTPATIENT)
Dept: SURGERY | Facility: CLINIC | Age: 54
End: 2024-07-01
Payer: COMMERCIAL

## 2024-07-02 ENCOUNTER — TELEPHONE (OUTPATIENT)
Dept: RADIOLOGY | Facility: HOSPITAL | Age: 54
End: 2024-07-02
Payer: COMMERCIAL

## 2024-07-02 NOTE — TELEPHONE ENCOUNTER
This RN Navigator contacted patient for  outreach to identify barriers/needs, and offer support. Reintroduced myself to patient and role, though patient recalled our prior meeting. Patient verbalized having reviewed new diagnosis of ILC at recent provider follow up and received education regarding the diagnosis. Therapeutic listening implemented. Patient states her plan includes transfer of care to [facility] with consideration for mastectomy. Discussed options for supportive services/ education resources if needed, though patient declines at this time and states she has no questions at this time she would like to address. Patient states friends & family are a great support. Advised patient she may contact this RN Navigator as needed.

## 2024-07-18 ENCOUNTER — DOCUMENTATION (OUTPATIENT)
Dept: PRIMARY CARE | Facility: CLINIC | Age: 54
End: 2024-07-18
Payer: COMMERCIAL

## 2024-07-18 LAB — HEMOGLOBIN A1C/HEMOGLOBIN TOTAL IN BLOOD EXTERNAL: 8.1 %

## 2024-08-03 DIAGNOSIS — E11.9 TYPE 2 DIABETES MELLITUS WITHOUT COMPLICATIONS (MULTI): ICD-10-CM

## 2024-08-05 RX ORDER — GLIMEPIRIDE 2 MG/1
2 TABLET ORAL
Qty: 180 TABLET | Refills: 1 | Status: SHIPPED | OUTPATIENT
Start: 2024-08-05

## 2024-08-22 ENCOUNTER — APPOINTMENT (OUTPATIENT)
Dept: PRIMARY CARE | Facility: CLINIC | Age: 54
End: 2024-08-22
Payer: COMMERCIAL

## 2024-08-22 VITALS
BODY MASS INDEX: 29.09 KG/M2 | HEIGHT: 66 IN | HEART RATE: 83 BPM | DIASTOLIC BLOOD PRESSURE: 62 MMHG | WEIGHT: 181 LBS | SYSTOLIC BLOOD PRESSURE: 128 MMHG

## 2024-08-22 DIAGNOSIS — F32.A DEPRESSION, UNSPECIFIED: ICD-10-CM

## 2024-08-22 DIAGNOSIS — E11.9 TYPE 2 DIABETES MELLITUS WITHOUT COMPLICATIONS (MULTI): ICD-10-CM

## 2024-08-22 DIAGNOSIS — G89.4 CHRONIC PAIN SYNDROME: ICD-10-CM

## 2024-08-22 DIAGNOSIS — C50.912 MALIGNANT NEOPLASM OF LEFT FEMALE BREAST, UNSPECIFIED ESTROGEN RECEPTOR STATUS, UNSPECIFIED SITE OF BREAST (MULTI): Primary | ICD-10-CM

## 2024-08-22 DIAGNOSIS — Z12.31 VISIT FOR SCREENING MAMMOGRAM: ICD-10-CM

## 2024-08-22 DIAGNOSIS — E55.9 VITAMIN D DEFICIENCY: ICD-10-CM

## 2024-08-22 DIAGNOSIS — K21.9 GASTRO-ESOPHAGEAL REFLUX DISEASE WITHOUT ESOPHAGITIS: ICD-10-CM

## 2024-08-22 PROBLEM — C50.919 BREAST CANCER (MULTI): Status: ACTIVE | Noted: 2024-08-22

## 2024-08-22 PROCEDURE — 3008F BODY MASS INDEX DOCD: CPT | Performed by: CLINICAL NURSE SPECIALIST

## 2024-08-22 PROCEDURE — 3074F SYST BP LT 130 MM HG: CPT | Performed by: CLINICAL NURSE SPECIALIST

## 2024-08-22 PROCEDURE — 99214 OFFICE O/P EST MOD 30 MIN: CPT | Performed by: CLINICAL NURSE SPECIALIST

## 2024-08-22 PROCEDURE — 4004F PT TOBACCO SCREEN RCVD TLK: CPT | Performed by: CLINICAL NURSE SPECIALIST

## 2024-08-22 PROCEDURE — 3052F HG A1C>EQUAL 8.0%<EQUAL 9.0%: CPT | Performed by: CLINICAL NURSE SPECIALIST

## 2024-08-22 PROCEDURE — 3078F DIAST BP <80 MM HG: CPT | Performed by: CLINICAL NURSE SPECIALIST

## 2024-08-22 RX ORDER — GABAPENTIN 300 MG/1
300 CAPSULE ORAL 2 TIMES DAILY
Qty: 180 CAPSULE | Refills: 1 | Status: SHIPPED | OUTPATIENT
Start: 2024-08-22 | End: 2025-02-18

## 2024-08-22 ASSESSMENT — ENCOUNTER SYMPTOMS
BRUISES/BLEEDS EASILY: 0
BACK PAIN: 0
ARTHRALGIAS: 0
UNEXPECTED WEIGHT CHANGE: 0
DIZZINESS: 0
FLANK PAIN: 0
SORE THROAT: 0
FEVER: 0
POLYDIPSIA: 0
CHEST TIGHTNESS: 0
HEMATURIA: 0
VOMITING: 0
JOINT SWELLING: 0
CHILLS: 0
COUGH: 0
EYE PAIN: 0
SLEEP DISTURBANCE: 0
WHEEZING: 0
CONFUSION: 0
ACTIVITY CHANGE: 0
PHOTOPHOBIA: 0
ABDOMINAL PAIN: 0
TROUBLE SWALLOWING: 0
APPETITE CHANGE: 0
DEPRESSION: 1
PALPITATIONS: 0
SEIZURES: 0
LOSS OF SENSATION IN FEET: 0
WOUND: 0
SHORTNESS OF BREATH: 0
DYSURIA: 0
OCCASIONAL FEELINGS OF UNSTEADINESS: 0
MYALGIAS: 0
FATIGUE: 0
CONSTIPATION: 0
HEADACHES: 0
BLOOD IN STOOL: 0
NECK PAIN: 0
NAUSEA: 0
DIARRHEA: 0

## 2024-08-22 ASSESSMENT — COLUMBIA-SUICIDE SEVERITY RATING SCALE - C-SSRS
2. HAVE YOU ACTUALLY HAD ANY THOUGHTS OF KILLING YOURSELF?: NO
1. IN THE PAST MONTH, HAVE YOU WISHED YOU WERE DEAD OR WISHED YOU COULD GO TO SLEEP AND NOT WAKE UP?: NO
6. HAVE YOU EVER DONE ANYTHING, STARTED TO DO ANYTHING, OR PREPARED TO DO ANYTHING TO END YOUR LIFE?: NO

## 2024-08-22 ASSESSMENT — PATIENT HEALTH QUESTIONNAIRE - PHQ9
1. LITTLE INTEREST OR PLEASURE IN DOING THINGS: NOT AT ALL
2. FEELING DOWN, DEPRESSED OR HOPELESS: SEVERAL DAYS
SUM OF ALL RESPONSES TO PHQ9 QUESTIONS 1 AND 2: 1

## 2024-08-22 NOTE — PROGRESS NOTES
Subjective   Patient ID: Rox Munoz is a 53 y.o. female who presents for Follow-up (Follow up).  HPI    Diabetes. Has been on Metformin, Jardiance and Glimepiride for management. Tolerating medications well. Lab work completed, last A1C 8.1% prior to Surgery in July 2024. Since then she has been working to change her diet to control her readings. Would like to hold off on medication adjustments until follow up lab work completed. Does not monitor her blood sugars at home often. Monitors foot care. `     Previously had been on Celexa and Wellbutrin. Discontinued Wellbutrin, years ago. Not sure why she stopped medication. Has continued on Celexa. Added Buspirone. Continued medication. Has used rarely, PRN.      Chronic issues with back/hip/knee pain. Extends down the leg. Worse on palpation of the left knee. Denies any specific injury/trauma. Previously tried Gabapentin that was beneficial. Restarted after last OV. Has worked well for her. At times will need a  second pill, increased effectiveness of medication. Still with knee discomfort.      Omeprazole controls GI symptoms well. Symptoms worsen off of medication.     Diagnosed with Breast Cancer. Mastectomy with SLNB without reconstruction. Planning to start Chemo when healed. Has an appointment next week with Hematology Oncology and Radiation. Considering Reconstruction next year.  Drain in place, considering removal next week.      Review of Systems   Constitutional:  Negative for activity change, appetite change, chills, fatigue, fever and unexpected weight change.   HENT:  Negative for ear pain, hearing loss, nosebleeds, sore throat, tinnitus and trouble swallowing.    Eyes:  Negative for photophobia, pain and visual disturbance.   Respiratory:  Negative for cough, chest tightness, shortness of breath and wheezing.    Cardiovascular:  Negative for chest pain, palpitations and leg swelling.   Gastrointestinal:  Negative for abdominal pain, blood in stool,  constipation, diarrhea, nausea and vomiting.   Endocrine: Negative for cold intolerance, heat intolerance, polydipsia and polyuria.   Genitourinary:  Negative for dysuria, flank pain and hematuria.   Musculoskeletal:  Negative for arthralgias, back pain, joint swelling, myalgias and neck pain.   Skin:  Negative for pallor, rash and wound.   Allergic/Immunologic: Negative for immunocompromised state.   Neurological:  Negative for dizziness, seizures and headaches.   Hematological:  Does not bruise/bleed easily.   Psychiatric/Behavioral:  Negative for confusion and sleep disturbance.        Objective   Physical Exam  Vitals and nursing note reviewed.   Constitutional:       General: She is not in acute distress.     Appearance: Normal appearance.   HENT:      Head: Normocephalic.      Nose: Nose normal.   Eyes:      Conjunctiva/sclera: Conjunctivae normal.   Neck:      Vascular: No carotid bruit.   Cardiovascular:      Rate and Rhythm: Normal rate and regular rhythm.      Pulses: Normal pulses.      Heart sounds: Normal heart sounds.   Pulmonary:      Effort: Pulmonary effort is normal.      Breath sounds: Normal breath sounds.   Abdominal:      General: Bowel sounds are normal.      Palpations: Abdomen is soft.   Musculoskeletal:         General: Normal range of motion.      Cervical back: Normal range of motion.   Skin:     General: Skin is warm and dry.   Neurological:      Mental Status: She is alert and oriented to person, place, and time. Mental status is at baseline.   Psychiatric:         Mood and Affect: Mood normal.         Behavior: Behavior normal.       Assessment/Plan        Reviewed results of blood work completed with patient.      Diabetes mellitus type 2. A1C 8.1%. Continue Metformin, taking the full dosage, Jardiance, and Glimepiride as previously prescribed. She has no history of diabetic complications. If level remains elevated will further adjust medications. Previously was on injectables, did  not tolerate. Albumin ordered.   Dyslipidemia. Updated lab work ordered. She does not wish to be on prescription statin. Will restart Vascepa 2 capsules twice daily. CT Cardiac Scoring: May 2024, score 0.   Fatty liver disease. Liver enzymes had improved with weight loss. Discussed ETOH cessation. Will monitor she had magnetic resonance imaging of the abdomen December 2016 with no evidence of lesions. Will monitor for signs of cirrhosis  gastroesophageal reflux disease with history of Casiano's esophagus. continue omeprazole daily. Repeat EGD December 2016, showed no evidence of Casiano's. She is currently asymptomatic. She was told that she did not need repeat EGD unless she was symptomatic.   Depression/anxiety. Continue Celexa and Buspirone. Reevaluate at follow up appointment.   Postmenopausal symptoms. Previously was on Estradiol and Progesterone, has since stopped medications. Taking OTC supplement for management.   Recurrent angular cheilitis. Responds well to oral Diflucan as needed.  Vitamin D deficiency. Reevaluate with next lab work.   Chronic Pain: Gabapentin as prescribed effective in the past. I have personally reviewed the OARRS report.  This report is scanned into the electronic medical record.  I have considered the risks of abuse, dependence, addiction and diversion.  I believe that it is clinically appropriate to prescribe this medication.  Breast Cancer: [facility].      Colonoscopy January 2020 repeat in 5 years recommended.  Tdap: March 2016  Flu Vaccine: November 2023.   Shingrix: August 2023. February 2024.   Prevnar: February 2024.   COVID Vaccine: November 2023.     Kaitlin Rhoades, RENE-CNS 08/22/24 10:43 AM

## 2024-08-29 DIAGNOSIS — K21.9 GASTRO-ESOPHAGEAL REFLUX DISEASE WITHOUT ESOPHAGITIS: ICD-10-CM

## 2024-08-29 RX ORDER — OMEPRAZOLE 40 MG/1
40 CAPSULE, DELAYED RELEASE ORAL DAILY
Qty: 90 CAPSULE | Refills: 3 | Status: SHIPPED | OUTPATIENT
Start: 2024-08-29

## 2024-10-21 ENCOUNTER — LAB (OUTPATIENT)
Dept: LAB | Facility: LAB | Age: 54
End: 2024-10-21
Payer: COMMERCIAL

## 2024-10-21 DIAGNOSIS — F32.A DEPRESSION, UNSPECIFIED: ICD-10-CM

## 2024-10-21 DIAGNOSIS — G89.4 CHRONIC PAIN SYNDROME: ICD-10-CM

## 2024-10-21 DIAGNOSIS — E11.9 TYPE 2 DIABETES MELLITUS WITHOUT COMPLICATIONS (MULTI): ICD-10-CM

## 2024-10-21 DIAGNOSIS — E55.9 VITAMIN D DEFICIENCY: ICD-10-CM

## 2024-10-21 DIAGNOSIS — Z12.31 VISIT FOR SCREENING MAMMOGRAM: ICD-10-CM

## 2024-10-21 DIAGNOSIS — K21.9 GASTRO-ESOPHAGEAL REFLUX DISEASE WITHOUT ESOPHAGITIS: ICD-10-CM

## 2024-10-21 LAB
25(OH)D3 SERPL-MCNC: 23 NG/ML (ref 30–100)
ALBUMIN SERPL BCP-MCNC: 4.1 G/DL (ref 3.4–5)
ALP SERPL-CCNC: 126 U/L (ref 33–110)
ALT SERPL W P-5'-P-CCNC: 41 U/L (ref 7–45)
ANION GAP SERPL CALC-SCNC: 12 MMOL/L (ref 10–20)
AST SERPL W P-5'-P-CCNC: 24 U/L (ref 9–39)
BILIRUB SERPL-MCNC: 0.3 MG/DL (ref 0–1.2)
BUN SERPL-MCNC: 19 MG/DL (ref 6–23)
CALCIUM SERPL-MCNC: 8.9 MG/DL (ref 8.6–10.3)
CHLORIDE SERPL-SCNC: 104 MMOL/L (ref 98–107)
CHOLEST SERPL-MCNC: 196 MG/DL (ref 0–199)
CHOLESTEROL/HDL RATIO: 9
CO2 SERPL-SCNC: 27 MMOL/L (ref 21–32)
CREAT SERPL-MCNC: 0.61 MG/DL (ref 0.5–1.05)
CREAT UR-MCNC: 73.7 MG/DL (ref 20–320)
EGFRCR SERPLBLD CKD-EPI 2021: >90 ML/MIN/1.73M*2
ERYTHROCYTE [DISTWIDTH] IN BLOOD BY AUTOMATED COUNT: 15.1 % (ref 11.5–14.5)
EST. AVERAGE GLUCOSE BLD GHB EST-MCNC: 169 MG/DL
GLUCOSE SERPL-MCNC: 158 MG/DL (ref 74–99)
HBA1C MFR BLD: 7.5 %
HCT VFR BLD AUTO: 44.6 % (ref 36–46)
HCV AB SER QL: NONREACTIVE
HDLC SERPL-MCNC: 21.7 MG/DL
HGB BLD-MCNC: 14.3 G/DL (ref 12–16)
LDLC SERPL CALC-MCNC: ABNORMAL MG/DL
MCH RBC QN AUTO: 28.9 PG (ref 26–34)
MCHC RBC AUTO-ENTMCNC: 32.1 G/DL (ref 32–36)
MCV RBC AUTO: 90 FL (ref 80–100)
MICROALBUMIN UR-MCNC: 7.3 MG/L
MICROALBUMIN/CREAT UR: 9.9 UG/MG CREAT
NON HDL CHOLESTEROL: 174 MG/DL (ref 0–149)
NRBC BLD-RTO: 0.7 /100 WBCS (ref 0–0)
PLATELET # BLD AUTO: 146 X10*3/UL (ref 150–450)
POTASSIUM SERPL-SCNC: 4.2 MMOL/L (ref 3.5–5.3)
PROT SERPL-MCNC: 6.3 G/DL (ref 6.4–8.2)
RBC # BLD AUTO: 4.95 X10*6/UL (ref 4–5.2)
SODIUM SERPL-SCNC: 139 MMOL/L (ref 136–145)
TRIGL SERPL-MCNC: 496 MG/DL (ref 0–149)
TSH SERPL-ACNC: 1.26 MIU/L (ref 0.44–3.98)
VIT B12 SERPL-MCNC: 5712 PG/ML (ref 211–911)
VLDL: ABNORMAL
WBC # BLD AUTO: 14.6 X10*3/UL (ref 4.4–11.3)

## 2024-10-21 PROCEDURE — 84443 ASSAY THYROID STIM HORMONE: CPT

## 2024-10-21 PROCEDURE — 85027 COMPLETE CBC AUTOMATED: CPT

## 2024-10-21 PROCEDURE — 36415 COLL VENOUS BLD VENIPUNCTURE: CPT

## 2024-10-21 PROCEDURE — 80053 COMPREHEN METABOLIC PANEL: CPT

## 2024-10-21 PROCEDURE — 86803 HEPATITIS C AB TEST: CPT

## 2024-10-21 PROCEDURE — 82306 VITAMIN D 25 HYDROXY: CPT

## 2024-10-21 PROCEDURE — 82043 UR ALBUMIN QUANTITATIVE: CPT

## 2024-10-21 PROCEDURE — 83036 HEMOGLOBIN GLYCOSYLATED A1C: CPT

## 2024-10-21 PROCEDURE — 82570 ASSAY OF URINE CREATININE: CPT

## 2024-10-21 PROCEDURE — 80061 LIPID PANEL: CPT

## 2024-10-21 PROCEDURE — 82607 VITAMIN B-12: CPT

## 2024-10-23 ENCOUNTER — APPOINTMENT (OUTPATIENT)
Dept: PRIMARY CARE | Facility: CLINIC | Age: 54
End: 2024-10-23
Payer: COMMERCIAL

## 2024-10-23 VITALS
WEIGHT: 178 LBS | SYSTOLIC BLOOD PRESSURE: 124 MMHG | HEIGHT: 66 IN | HEART RATE: 89 BPM | DIASTOLIC BLOOD PRESSURE: 60 MMHG | BODY MASS INDEX: 28.61 KG/M2

## 2024-10-23 DIAGNOSIS — C50.912 MALIGNANT NEOPLASM OF LEFT FEMALE BREAST, UNSPECIFIED ESTROGEN RECEPTOR STATUS, UNSPECIFIED SITE OF BREAST: ICD-10-CM

## 2024-10-23 DIAGNOSIS — Z12.31 VISIT FOR SCREENING MAMMOGRAM: ICD-10-CM

## 2024-10-23 DIAGNOSIS — E11.9 TYPE 2 DIABETES MELLITUS WITHOUT COMPLICATIONS (MULTI): ICD-10-CM

## 2024-10-23 DIAGNOSIS — K21.9 GASTRO-ESOPHAGEAL REFLUX DISEASE WITHOUT ESOPHAGITIS: ICD-10-CM

## 2024-10-23 DIAGNOSIS — G89.4 CHRONIC PAIN SYNDROME: ICD-10-CM

## 2024-10-23 DIAGNOSIS — J30.9 ALLERGIC RHINITIS, UNSPECIFIED SEASONALITY, UNSPECIFIED TRIGGER: Primary | ICD-10-CM

## 2024-10-23 DIAGNOSIS — E55.9 VITAMIN D DEFICIENCY: ICD-10-CM

## 2024-10-23 DIAGNOSIS — F32.A DEPRESSION, UNSPECIFIED: ICD-10-CM

## 2024-10-23 PROCEDURE — 3008F BODY MASS INDEX DOCD: CPT | Performed by: CLINICAL NURSE SPECIALIST

## 2024-10-23 PROCEDURE — 3061F NEG MICROALBUMINURIA REV: CPT | Performed by: CLINICAL NURSE SPECIALIST

## 2024-10-23 PROCEDURE — 3078F DIAST BP <80 MM HG: CPT | Performed by: CLINICAL NURSE SPECIALIST

## 2024-10-23 PROCEDURE — 3074F SYST BP LT 130 MM HG: CPT | Performed by: CLINICAL NURSE SPECIALIST

## 2024-10-23 PROCEDURE — 4004F PT TOBACCO SCREEN RCVD TLK: CPT | Performed by: CLINICAL NURSE SPECIALIST

## 2024-10-23 PROCEDURE — 99214 OFFICE O/P EST MOD 30 MIN: CPT | Performed by: CLINICAL NURSE SPECIALIST

## 2024-10-23 PROCEDURE — 3051F HG A1C>EQUAL 7.0%<8.0%: CPT | Performed by: CLINICAL NURSE SPECIALIST

## 2024-10-23 RX ORDER — AZELASTINE HYDROCHLORIDE 0.5 MG/ML
1 SOLUTION/ DROPS OPHTHALMIC 2 TIMES DAILY
Qty: 6 ML | Refills: 2 | Status: SHIPPED | OUTPATIENT
Start: 2024-10-23 | End: 2025-10-23

## 2024-10-23 ASSESSMENT — ENCOUNTER SYMPTOMS
SLEEP DISTURBANCE: 0
ABDOMINAL PAIN: 0
ACTIVITY CHANGE: 0
BACK PAIN: 0
CHEST TIGHTNESS: 0
JOINT SWELLING: 0
DIZZINESS: 0
APPETITE CHANGE: 0
SEIZURES: 0
HEMATURIA: 0
COUGH: 0
NAUSEA: 0
CONSTIPATION: 0
NECK PAIN: 0
CHILLS: 0
DYSURIA: 0
SHORTNESS OF BREATH: 0
DEPRESSION: 0
CONFUSION: 0
FATIGUE: 0
SORE THROAT: 0
TROUBLE SWALLOWING: 0
OCCASIONAL FEELINGS OF UNSTEADINESS: 0
WHEEZING: 0
LOSS OF SENSATION IN FEET: 0
PALPITATIONS: 0
MYALGIAS: 0
PHOTOPHOBIA: 0
UNEXPECTED WEIGHT CHANGE: 0
BRUISES/BLEEDS EASILY: 0
HEADACHES: 0
BLOOD IN STOOL: 0
DIARRHEA: 0
EYE PAIN: 0
FLANK PAIN: 0
FEVER: 0
VOMITING: 0
WOUND: 0
ARTHRALGIAS: 0
POLYDIPSIA: 0

## 2024-10-23 ASSESSMENT — COLUMBIA-SUICIDE SEVERITY RATING SCALE - C-SSRS
6. HAVE YOU EVER DONE ANYTHING, STARTED TO DO ANYTHING, OR PREPARED TO DO ANYTHING TO END YOUR LIFE?: NO
2. HAVE YOU ACTUALLY HAD ANY THOUGHTS OF KILLING YOURSELF?: NO
1. IN THE PAST MONTH, HAVE YOU WISHED YOU WERE DEAD OR WISHED YOU COULD GO TO SLEEP AND NOT WAKE UP?: NO

## 2024-10-23 NOTE — PROGRESS NOTES
Subjective   Patient ID: Rox Munoz is a 53 y.o. female who presents for Follow-up (Follow up).  HPI     Here today as a follow up appointment.     Diabetes. Has been on Metformin, Jardiance and Glimepiride for management. Tolerating medications well. Lab work completed, last A1C 7.5%. Since then she has been working to change her diet to control her readings. Would like to hold off on medication adjustments until follow up lab work completed. Does not monitor her blood sugars at home often. Monitors foot care.      Previously had been on Celexa and Wellbutrin. Discontinued Wellbutrin, years ago. Not sure why she stopped medication. Has continued on Celexa. Added Buspirone. Continued medication. Has used rarely, PRN.      Chronic issues with back/hip/knee pain. Extends down the leg. Worse on palpation of the left knee. Denies any specific injury/trauma. Previously tried Gabapentin that was beneficial. Restarted after last OV. Has worked well for her. At times will need a  second pill, increased effectiveness of medication. Still with knee discomfort.      Omeprazole controls GI symptoms well. Symptoms worsen off of medication.      Diagnosed with Breast Cancer. Mastectomy with SLNB without reconstruction. Started Chemo, planning every 2 weeks for 8 sessions and then weekly. Considering Reconstruction next year.  So far, tolerating her treatments.     Review of Systems   Constitutional:  Negative for activity change, appetite change, chills, fatigue, fever and unexpected weight change.   HENT:  Negative for ear pain, hearing loss, nosebleeds, sore throat, tinnitus and trouble swallowing.    Eyes:  Negative for photophobia, pain and visual disturbance.   Respiratory:  Negative for cough, chest tightness, shortness of breath and wheezing.    Cardiovascular:  Negative for chest pain, palpitations and leg swelling.   Gastrointestinal:  Negative for abdominal pain, blood in stool, constipation, diarrhea, nausea and  vomiting.   Endocrine: Negative for cold intolerance, heat intolerance, polydipsia and polyuria.   Genitourinary:  Negative for dysuria, flank pain and hematuria.   Musculoskeletal:  Negative for arthralgias, back pain, joint swelling, myalgias and neck pain.   Skin:  Negative for pallor, rash and wound.   Allergic/Immunologic: Negative for immunocompromised state.   Neurological:  Negative for dizziness, seizures and headaches.   Hematological:  Does not bruise/bleed easily.   Psychiatric/Behavioral:  Negative for confusion and sleep disturbance.        Objective   Physical Exam  Vitals and nursing note reviewed.   Constitutional:       General: She is not in acute distress.     Appearance: Normal appearance.   HENT:      Head: Normocephalic.      Nose: Nose normal.   Eyes:      Conjunctiva/sclera: Conjunctivae normal.   Neck:      Vascular: No carotid bruit.   Cardiovascular:      Rate and Rhythm: Normal rate and regular rhythm.      Pulses: Normal pulses.      Heart sounds: Normal heart sounds.   Pulmonary:      Effort: Pulmonary effort is normal.      Breath sounds: Normal breath sounds.   Abdominal:      General: Bowel sounds are normal.      Palpations: Abdomen is soft.   Musculoskeletal:         General: Normal range of motion.      Cervical back: Normal range of motion.   Skin:     General: Skin is warm and dry.   Neurological:      Mental Status: She is alert and oriented to person, place, and time. Mental status is at baseline.   Psychiatric:         Mood and Affect: Mood normal.         Behavior: Behavior normal.         Assessment/Plan         Reviewed results of blood work completed with patient.      Diabetes mellitus type 2. A1C 7.5%, down from 8.1%. Continue Metformin, Jardiance, and Glimepiride as previously prescribed. She has no history of diabetic complications. If level remains elevated will further adjust medications. Previously was on injectables, did not tolerate. Albumin: October 2024. Would  like to have medication adjustments with follow up lab work if needed.   Dyslipidemia. Updated lab work ordered. She does not wish to be on prescription statin or another prescription medication. Will restart Vascepa 2 capsules twice daily. CT Cardiac Scoring: May 2024, score 0.   Fatty liver disease. Liver enzymes had improved with weight loss. Discussed ETOH cessation. Will monitor she had magnetic resonance imaging of the abdomen December 2016 with no evidence of lesions. Will monitor for signs of cirrhosis  gastroesophageal reflux disease with history of Casiano's esophagus. continue omeprazole daily. Repeat EGD December 2016, showed no evidence of Casiano's. She is currently asymptomatic. She was told that she did not need repeat EGD unless she was symptomatic.   Depression/anxiety. Continue Celexa and Buspirone. Reevaluate at follow up appointment.   Postmenopausal symptoms. Previously was on Estradiol and Progesterone, has since stopped medications. Taking OTC supplement for management.   Recurrent angular cheilitis. Responds well to oral Diflucan as needed.  Vitamin D deficiency. Vitamin D. Reevaluate with next lab work.   Chronic Pain: Gabapentin as prescribed effective.   Breast Cancer: [facility]. Started Chemo and then planning Radiation following.   Rhinitis: Drops as prescribed. Follow up with medication effectiveness.     Colonoscopy January 2020 repeat in 5 years recommended.  Tdap: March 2016  Flu Vaccine: November 2023.   Shingrix: August 2023. February 2024.   Prevnar: February 2024.   COVID Vaccine: November 2023.        Kaitlin Rhoades, RENE-CNS 10/23/24 10:13 AM

## 2024-11-12 ENCOUNTER — TELEPHONE (OUTPATIENT)
Dept: PRIMARY CARE | Facility: CLINIC | Age: 54
End: 2024-11-12
Payer: COMMERCIAL

## 2024-11-12 NOTE — TELEPHONE ENCOUNTER
Patient is currently going through chemo and her oncologist recommended that patient reach out to PCP to get a swap test for strep throat. Can this be done in a nurse visit?

## 2024-11-13 ENCOUNTER — CLINICAL SUPPORT (OUTPATIENT)
Dept: PRIMARY CARE | Facility: CLINIC | Age: 54
End: 2024-11-13
Payer: COMMERCIAL

## 2024-11-13 DIAGNOSIS — J02.9 SORE THROAT: ICD-10-CM

## 2024-11-13 LAB — POC RAPID STREP: NEGATIVE

## 2024-11-13 PROCEDURE — 87880 STREP A ASSAY W/OPTIC: CPT | Performed by: CLINICAL NURSE SPECIALIST

## 2024-11-14 DIAGNOSIS — J30.9 ALLERGIC RHINITIS, UNSPECIFIED SEASONALITY, UNSPECIFIED TRIGGER: ICD-10-CM

## 2024-11-14 RX ORDER — AZELASTINE HYDROCHLORIDE 0.5 MG/ML
1 SOLUTION/ DROPS OPHTHALMIC 2 TIMES DAILY
Qty: 18 ML | Refills: 1 | Status: SHIPPED | OUTPATIENT
Start: 2024-11-14 | End: 2025-11-14

## 2024-12-16 DIAGNOSIS — F32.A DEPRESSION, UNSPECIFIED: ICD-10-CM

## 2024-12-16 RX ORDER — CITALOPRAM 40 MG/1
40 TABLET, FILM COATED ORAL DAILY
Qty: 90 TABLET | Refills: 3 | Status: SHIPPED | OUTPATIENT
Start: 2024-12-16

## 2024-12-19 DIAGNOSIS — E11.65 TYPE 2 DIABETES MELLITUS WITH HYPERGLYCEMIA, WITHOUT LONG-TERM CURRENT USE OF INSULIN: ICD-10-CM

## 2024-12-19 RX ORDER — EMPAGLIFLOZIN 25 MG/1
25 TABLET, FILM COATED ORAL DAILY
Qty: 90 TABLET | Refills: 2 | Status: SHIPPED | OUTPATIENT
Start: 2024-12-19

## 2025-03-12 DIAGNOSIS — E11.9 TYPE 2 DIABETES MELLITUS WITHOUT COMPLICATIONS (MULTI): ICD-10-CM

## 2025-03-12 RX ORDER — METFORMIN HYDROCHLORIDE 500 MG/1
TABLET ORAL
Qty: 360 TABLET | Refills: 1 | Status: SHIPPED | OUTPATIENT
Start: 2025-03-12

## 2025-03-25 LAB
25(OH)D3+25(OH)D2 SERPL-MCNC: 17 NG/ML (ref 30–100)
ALBUMIN SERPL-MCNC: 4.1 G/DL (ref 3.6–5.1)
ALP SERPL-CCNC: 98 U/L (ref 37–153)
ALT SERPL-CCNC: 22 U/L (ref 6–29)
ANION GAP SERPL CALCULATED.4IONS-SCNC: 9 MMOL/L (CALC) (ref 7–17)
AST SERPL-CCNC: 22 U/L (ref 10–35)
BILIRUB SERPL-MCNC: 0.4 MG/DL (ref 0.2–1.2)
BUN SERPL-MCNC: 18 MG/DL (ref 7–25)
CALCIUM SERPL-MCNC: 9.2 MG/DL (ref 8.6–10.4)
CHLORIDE SERPL-SCNC: 105 MMOL/L (ref 98–110)
CHOLEST SERPL-MCNC: 219 MG/DL
CHOLEST/HDLC SERPL: 6.3 (CALC)
CO2 SERPL-SCNC: 24 MMOL/L (ref 20–32)
CREAT SERPL-MCNC: 0.56 MG/DL (ref 0.5–1.03)
EGFRCR SERPLBLD CKD-EPI 2021: 107 ML/MIN/1.73M2
GLUCOSE SERPL-MCNC: 193 MG/DL (ref 65–99)
HDLC SERPL-MCNC: 35 MG/DL
LDLC SERPL CALC-MCNC: 141 MG/DL (CALC)
NONHDLC SERPL-MCNC: 184 MG/DL (CALC)
POTASSIUM SERPL-SCNC: 4.2 MMOL/L (ref 3.5–5.3)
PROT SERPL-MCNC: 6.3 G/DL (ref 6.1–8.1)
SODIUM SERPL-SCNC: 138 MMOL/L (ref 135–146)
TRIGL SERPL-MCNC: 301 MG/DL

## 2025-03-26 LAB
EST. AVERAGE GLUCOSE BLD GHB EST-MCNC: 183 MG/DL
EST. AVERAGE GLUCOSE BLD GHB EST-SCNC: 10.1 MMOL/L
HBA1C MFR BLD: 8 % OF TOTAL HGB

## 2025-03-31 ENCOUNTER — APPOINTMENT (OUTPATIENT)
Dept: PRIMARY CARE | Facility: CLINIC | Age: 55
End: 2025-03-31
Payer: COMMERCIAL

## 2025-03-31 VITALS
HEIGHT: 66 IN | BODY MASS INDEX: 29.57 KG/M2 | WEIGHT: 184 LBS | DIASTOLIC BLOOD PRESSURE: 70 MMHG | HEART RATE: 81 BPM | SYSTOLIC BLOOD PRESSURE: 124 MMHG

## 2025-03-31 DIAGNOSIS — J30.9 ALLERGIC RHINITIS, UNSPECIFIED SEASONALITY, UNSPECIFIED TRIGGER: ICD-10-CM

## 2025-03-31 DIAGNOSIS — E55.9 VITAMIN D DEFICIENCY: ICD-10-CM

## 2025-03-31 DIAGNOSIS — Z12.31 VISIT FOR SCREENING MAMMOGRAM: ICD-10-CM

## 2025-03-31 DIAGNOSIS — F41.1 GENERALIZED ANXIETY DISORDER: Primary | ICD-10-CM

## 2025-03-31 DIAGNOSIS — G89.4 CHRONIC PAIN SYNDROME: ICD-10-CM

## 2025-03-31 DIAGNOSIS — K21.9 GASTRO-ESOPHAGEAL REFLUX DISEASE WITHOUT ESOPHAGITIS: ICD-10-CM

## 2025-03-31 DIAGNOSIS — F32.A DEPRESSION, UNSPECIFIED: ICD-10-CM

## 2025-03-31 DIAGNOSIS — E11.9 TYPE 2 DIABETES MELLITUS WITHOUT COMPLICATIONS: ICD-10-CM

## 2025-03-31 PROCEDURE — 3074F SYST BP LT 130 MM HG: CPT | Performed by: CLINICAL NURSE SPECIALIST

## 2025-03-31 PROCEDURE — 96372 THER/PROPH/DIAG INJ SC/IM: CPT | Performed by: CLINICAL NURSE SPECIALIST

## 2025-03-31 PROCEDURE — 3078F DIAST BP <80 MM HG: CPT | Performed by: CLINICAL NURSE SPECIALIST

## 2025-03-31 PROCEDURE — 3008F BODY MASS INDEX DOCD: CPT | Performed by: CLINICAL NURSE SPECIALIST

## 2025-03-31 PROCEDURE — 99214 OFFICE O/P EST MOD 30 MIN: CPT | Performed by: CLINICAL NURSE SPECIALIST

## 2025-03-31 RX ORDER — KETOROLAC TROMETHAMINE 30 MG/ML
30 INJECTION, SOLUTION INTRAMUSCULAR; INTRAVENOUS ONCE
Status: COMPLETED | OUTPATIENT
Start: 2025-03-31 | End: 2025-03-31

## 2025-03-31 RX ORDER — HYDROXYZINE PAMOATE 25 MG/1
25 CAPSULE ORAL DAILY PRN
Qty: 30 CAPSULE | Refills: 1 | Status: SHIPPED | OUTPATIENT
Start: 2025-03-31 | End: 2025-05-30

## 2025-03-31 RX ORDER — GABAPENTIN 300 MG/1
300 CAPSULE ORAL 2 TIMES DAILY
Qty: 180 CAPSULE | Refills: 1 | Status: SHIPPED | OUTPATIENT
Start: 2025-03-31 | End: 2025-09-27

## 2025-03-31 RX ORDER — GLIMEPIRIDE 4 MG/1
4 TABLET ORAL
Qty: 180 TABLET | Refills: 3 | Status: SHIPPED | OUTPATIENT
Start: 2025-03-31 | End: 2026-03-31

## 2025-03-31 RX ADMIN — KETOROLAC TROMETHAMINE 30 MG: 30 INJECTION, SOLUTION INTRAMUSCULAR; INTRAVENOUS at 12:05

## 2025-03-31 ASSESSMENT — ENCOUNTER SYMPTOMS
DIARRHEA: 0
CONFUSION: 0
ABDOMINAL PAIN: 0
SEIZURES: 0
ARTHRALGIAS: 1
SORE THROAT: 0
POLYDIPSIA: 0
CHILLS: 0
VOMITING: 0
UNEXPECTED WEIGHT CHANGE: 0
TROUBLE SWALLOWING: 0
WHEEZING: 0
APPETITE CHANGE: 0
CHEST TIGHTNESS: 0
PALPITATIONS: 0
MYALGIAS: 0
BRUISES/BLEEDS EASILY: 0
ACTIVITY CHANGE: 0
HEADACHES: 0
PHOTOPHOBIA: 0
NAUSEA: 0
HEMATURIA: 0
BACK PAIN: 1
FATIGUE: 1
NECK PAIN: 0
EYE PAIN: 0
JOINT SWELLING: 0
OCCASIONAL FEELINGS OF UNSTEADINESS: 0
DYSURIA: 0
LOSS OF SENSATION IN FEET: 0
FEVER: 0
COUGH: 0
SLEEP DISTURBANCE: 0
FLANK PAIN: 0
WOUND: 0
DEPRESSION: 0
SHORTNESS OF BREATH: 0
CONSTIPATION: 0
BLOOD IN STOOL: 0
DIZZINESS: 0

## 2025-03-31 ASSESSMENT — COLUMBIA-SUICIDE SEVERITY RATING SCALE - C-SSRS
6. HAVE YOU EVER DONE ANYTHING, STARTED TO DO ANYTHING, OR PREPARED TO DO ANYTHING TO END YOUR LIFE?: NO
1. IN THE PAST MONTH, HAVE YOU WISHED YOU WERE DEAD OR WISHED YOU COULD GO TO SLEEP AND NOT WAKE UP?: NO
2. HAVE YOU ACTUALLY HAD ANY THOUGHTS OF KILLING YOURSELF?: NO

## 2025-03-31 ASSESSMENT — PATIENT HEALTH QUESTIONNAIRE - PHQ9
2. FEELING DOWN, DEPRESSED OR HOPELESS: NOT AT ALL
1. LITTLE INTEREST OR PLEASURE IN DOING THINGS: NOT AT ALL
SUM OF ALL RESPONSES TO PHQ9 QUESTIONS 1 AND 2: 0

## 2025-03-31 NOTE — PROGRESS NOTES
Subjective   Patient ID: Rox Munoz is a 54 y.o. female who presents for Follow-up (Follow up labs/Discuss Sciatica pain ).  HPI    Here today as a follow up appointment.     Sciatica pain has been worse, requesting a Toradol injection.      Diabetes. Has been on Metformin, Jardiance, and Glimepiride for management. Tolerating medications well until she started to have Yeast infections from the Jardiance. Discontinued medication and infections resolved. Lab work completed. Does not monitor her blood sugars at home often. Monitors foot care.      Previously had been on Celexa and Wellbutrin. Discontinued Wellbutrin, years ago. Not sure why she stopped medication. Has continued on Celexa. Added Buspirone. Continued medication. Has used rarely, PRN.      Chronic issues with back/hip/knee pain. Extends down the leg. Worse on palpation of the left knee. Denies any specific injury/trauma. Previously tried Gabapentin that was beneficial. Restarted after last OV. Has worked well for her. At times will need a  second pill, increased effectiveness of medication. Still with knee discomfort.      Omeprazole controls GI symptoms well. Symptoms worsen off of medication.      Diagnosed with Breast Cancer. Mastectomy with SLNB without reconstruction. Completed Chemo at the beginning of the Month. Planning to start Radiation on 4/10 for 5 weeks. No longer considering Reconstruction. Has noted continued complaints of Fatigue.     Review of Systems   Constitutional:  Positive for fatigue. Negative for activity change, appetite change, chills, fever and unexpected weight change.   HENT:  Negative for ear pain, hearing loss, nosebleeds, sore throat, tinnitus and trouble swallowing.    Eyes:  Negative for photophobia, pain and visual disturbance.   Respiratory:  Negative for cough, chest tightness, shortness of breath and wheezing.    Cardiovascular:  Negative for chest pain, palpitations and leg swelling.   Gastrointestinal:   Negative for abdominal pain, blood in stool, constipation, diarrhea, nausea and vomiting.   Endocrine: Negative for cold intolerance, heat intolerance, polydipsia and polyuria.   Genitourinary:  Negative for dysuria, flank pain and hematuria.   Musculoskeletal:  Positive for arthralgias and back pain. Negative for joint swelling, myalgias and neck pain.   Skin:  Negative for pallor, rash and wound.   Allergic/Immunologic: Negative for immunocompromised state.   Neurological:  Negative for dizziness, seizures and headaches.   Hematological:  Does not bruise/bleed easily.   Psychiatric/Behavioral:  Negative for confusion and sleep disturbance.        Objective   Physical Exam  Vitals and nursing note reviewed.   Constitutional:       General: She is not in acute distress.     Appearance: Normal appearance.   HENT:      Head: Normocephalic.      Nose: Nose normal.   Eyes:      Conjunctiva/sclera: Conjunctivae normal.   Neck:      Vascular: No carotid bruit.   Cardiovascular:      Rate and Rhythm: Normal rate and regular rhythm.      Pulses: Normal pulses.      Heart sounds: Normal heart sounds.   Pulmonary:      Effort: Pulmonary effort is normal.      Breath sounds: Normal breath sounds.   Abdominal:      General: Bowel sounds are normal.      Palpations: Abdomen is soft.   Musculoskeletal:         General: Normal range of motion.      Cervical back: Normal range of motion.   Skin:     General: Skin is warm and dry.   Neurological:      Mental Status: She is alert and oriented to person, place, and time. Mental status is at baseline.   Psychiatric:         Mood and Affect: Mood normal.         Behavior: Behavior normal.         Assessment/Plan          Reviewed results of blood work completed with patient.      Diabetes mellitus type 2. A1C 8.0%. Discontinued Jardiance. Will continue Metformin and increase Glimepiride as prescribed. She has no history of diabetic complications. If level remains elevated will further  adjust medications. Previously was on injectables, did not tolerate. Albumin: October 2024.   Dyslipidemia. Updated lab work ordered. She does not wish to be on prescription statin or another prescription medication. Continue Vascepa 2 capsules twice daily. CT Cardiac Scoring: May 2024, score 0.   Fatty liver disease. Liver enzymes had improved with weight loss. Discussed ETOH cessation. Will monitor she had magnetic resonance imaging of the abdomen December 2016 with no evidence of lesions. Will monitor for signs of cirrhosis  gastroesophageal reflux disease with history of Casiano's esophagus. continue omeprazole daily. Repeat EGD December 2016, showed no evidence of Casiano's. She is currently asymptomatic. She was told that she did not need repeat EGD unless she was symptomatic.   Depression/anxiety. Continue Celexa and Buspirone. Hydroxyzine PRN. Reevaluate at follow up appointment.   Recurrent angular cheilitis. Responds well to oral Diflucan as needed.  Vitamin D deficiency. Vitamin D. Reevaluate with next lab work.   Chronic Pain: Gabapentin as prescribed effective. Toradol injection provided in the office today.   Breast Cancer: [facility]. Completed Chemo and will be starting Radiation in April.   Rhinitis: Drops as prescribed previously. Continue to monitor.     Colonoscopy January 2020 repeat in 5 years recommended. Will consider discussing at follow up appointment.   Tdap: March 2016  Flu Vaccine: November 2023.   Shingrix: August 2023. February 2024.   Prevnar: February 2024.   COVID Vaccine: November 2023.           Kaitlin Rhoades, APRN-CNS 03/31/25 11:35 AM

## 2025-04-23 DIAGNOSIS — J30.9 ALLERGIC RHINITIS, UNSPECIFIED SEASONALITY, UNSPECIFIED TRIGGER: ICD-10-CM

## 2025-04-23 DIAGNOSIS — F41.1 GENERALIZED ANXIETY DISORDER: ICD-10-CM

## 2025-04-23 RX ORDER — HYDROXYZINE PAMOATE 25 MG/1
25 CAPSULE ORAL DAILY PRN
Qty: 60 CAPSULE | Refills: 0 | Status: SHIPPED | OUTPATIENT
Start: 2025-04-23 | End: 2025-06-22

## 2025-05-23 ENCOUNTER — APPOINTMENT (OUTPATIENT)
Dept: OBSTETRICS AND GYNECOLOGY | Facility: CLINIC | Age: 55
End: 2025-05-23
Payer: COMMERCIAL

## 2025-05-23 VITALS — SYSTOLIC BLOOD PRESSURE: 126 MMHG | BODY MASS INDEX: 29.21 KG/M2 | WEIGHT: 181 LBS | DIASTOLIC BLOOD PRESSURE: 72 MMHG

## 2025-05-23 DIAGNOSIS — Z12.4 SCREENING FOR CERVICAL CANCER: ICD-10-CM

## 2025-05-23 DIAGNOSIS — N89.8 VAGINAL DISCHARGE: ICD-10-CM

## 2025-05-23 DIAGNOSIS — Z01.419 ENCOUNTER FOR WELL WOMAN EXAM WITH ROUTINE GYNECOLOGICAL EXAM: Primary | ICD-10-CM

## 2025-05-23 DIAGNOSIS — Z11.51 SCREENING FOR HPV (HUMAN PAPILLOMAVIRUS): ICD-10-CM

## 2025-05-23 PROCEDURE — 3078F DIAST BP <80 MM HG: CPT | Performed by: NURSE PRACTITIONER

## 2025-05-23 PROCEDURE — 99396 PREV VISIT EST AGE 40-64: CPT | Performed by: NURSE PRACTITIONER

## 2025-05-23 PROCEDURE — 87626 HPV SEP HI-RSK TYP&POOL RSLT: CPT

## 2025-05-23 PROCEDURE — 3074F SYST BP LT 130 MM HG: CPT | Performed by: NURSE PRACTITIONER

## 2025-05-23 RX ORDER — FLUCONAZOLE 150 MG/1
150 TABLET ORAL ONCE
Qty: 2 TABLET | Refills: 6 | Status: SHIPPED | OUTPATIENT
Start: 2025-05-23 | End: 2025-05-23

## 2025-05-23 RX ORDER — ANASTROZOLE 1 MG/1
1 TABLET ORAL
COMMUNITY
Start: 2025-05-22 | End: 2026-02-16

## 2025-05-23 NOTE — PROGRESS NOTES
"     HPI:   Rox Munoz is a 54 y.o. who presents today for her annual gynecologic exam with complaints    She has the following concerns;   Has a \"spot\" on the vagina. Denies any pain or drainage. Thinks it may be a genital wart.   Hx of left breast cancer since she was last seen. ER+ (95%), [place]- (<1%) and HER2- (+2 by IHC/FISH-) Has completed chemo and radiation.  Hx of left mastectomy. Recently started anastrozole this past month; having some fatigue and joint pain, but overall she is tolerating this well. She is coping well; has good support.  Follows with oncology. They have ordered her upcoming mammogram and upcoming DEXA scan.     GYN HISTORY:  Denies any postmenopausal bleeding.     PAP History   Due for PAP d/t recent hx of chemotherapy.   History of abnormal pap: no  HPV vaccine: no  @paphx@    Health Screening  Family history of breast, uterine, ovarian or colon cancer: no   Breast cancer: mammogram - done in March 2024; Cat. 1.  Colon cancer: Per PCP.       The patient feels safe at home.     Past Medical History:   Diagnosis Date    Abnormal levels of other serum enzymes     Elevated liver enzymes    Acute sinusitis, unspecified 03/15/2021    Acute sinusitis with symptoms > 10 days    H/O left mastectomy     Other conditions influencing health status 12/04/2019    History of cough    Other specified abnormal findings of blood chemistry     Low vitamin D level    Personal history of other diseases of the digestive system 03/15/2021    History of Casiano's esophagus    Personal history of other diseases of the musculoskeletal system and connective tissue     History of low back pain    Personal history of other diseases of the respiratory system 12/06/2022    History of acute sinusitis    Personal history of other infectious and parasitic diseases     History of herpes simplex infection    Persons encountering health services in other specified circumstances 09/01/2022    Encounter to establish care    " Right lower quadrant pain 12/08/2016    Right lower quadrant abdominal pain    Right upper quadrant pain 12/28/2016    Abdominal pain, RUQ    Tinea pedis 09/01/2022    Tinea pedis of both feet      Past Surgical History:   Procedure Laterality Date    FOOT SURGERY  10/27/2016    Foot Surgery    MASTECTOMY Left 08/2024    PORTACATH PLACEMENT              Review of Systems:   Constitutional: no fever and no chills.  Cardiovascular: no chest pain.   Respiratory: no shortness of breath.   Gastrointestinal: no nausea, no abdominal pain and no constipation  Genitourinary: no dysuria, no urinary incontinence, no vaginal dryness, no pelvic pain and no vaginal discharge.   Neurological: no headache.  Psychiatric: no anxiety and no depression.              Objective         /72   Wt 82.1 kg (181 lb)   BMI 29.21 kg/m²         Physical Exam:   Constitutional: Alert and in no acute distress. Well developed, well nourished.      Neck: No neck asymmetry. Supple. Thyroid not enlarged and there were no palpable thyroid nodules.      Cardiovascular: Heart rate and rhythm were normal, normal S1 and S2, no gallops, and no murmurs.      Pulmonary: No respiratory distress. Clear bilateral breath sounds.      Chest: Breasts: Normal appearance, no nipple discharge and no skin changes. Palpation of breasts and axillae: No palpable mass and no axillary lymphadenopathy. S/p mastectomy, left side.      Abdomen: Soft nontender; no abdominal mass palpated. Normal bowel sounds. No organomegaly.      Genitourinary:   - External genitalia: Normal. + genital wart on right vulva.   - Palpation of lymph nodes in groin: No inguinal lymphadenopathy.   - Bartholin's Urethral and Skenes Glands: Normal.   - Urethra: Normal.    -Bladder: Normal on palpation.   - Vagina: + erythema, thick, white discharge.   - Cervix: Normal.   - Uterus: Normal. Right Adnexa/parametria: Normal. Left Adnexa/parametria: Normal.   - Perianal Area: Normal.      Skin:  Normal skin color and pigmentation, normal skin turgor, and no rash     Psychiatric: Alert and oriented x 3. Affect normal to patient baseline. Mood: Appropriate.       Assessment/Plan   Diagnoses and all orders for this visit:  Encounter for well woman exam with routine gynecological exam  Here for well woman exam. She is doing well since her breast cancer diagnosis. She is coping well; has good family support. She follows with oncology. Mammograms ordered per oncology. PAP obtained.   Vaginal discharge  -     fluconazole (Diflucan) 150 mg tablet; Take 1 tablet (150 mg) by mouth 1 time for 1 dose. Repeat in 7 days if symptoms persist.  Screening for cervical cancer  -     THINPREP PAP TEST  -     HPV DNA High Risk With Genotype  Screening for HPV (human papillomavirus)  -     THINPREP PAP TEST  -     HPV DNA High Risk With Genotype  Follow-up annually; sooner if needed, or if she desires removal of genital wart.        RENE Malhotra-CNP 05/31/25 4:09 PM '

## 2025-06-30 DIAGNOSIS — G89.4 CHRONIC PAIN SYNDROME: ICD-10-CM

## 2025-06-30 DIAGNOSIS — J30.9 ALLERGIC RHINITIS, UNSPECIFIED SEASONALITY, UNSPECIFIED TRIGGER: ICD-10-CM

## 2025-06-30 DIAGNOSIS — F32.A DEPRESSION, UNSPECIFIED: ICD-10-CM

## 2025-06-30 DIAGNOSIS — K21.9 GASTRO-ESOPHAGEAL REFLUX DISEASE WITHOUT ESOPHAGITIS: ICD-10-CM

## 2025-06-30 DIAGNOSIS — F41.1 GENERALIZED ANXIETY DISORDER: ICD-10-CM

## 2025-06-30 DIAGNOSIS — Z12.31 VISIT FOR SCREENING MAMMOGRAM: ICD-10-CM

## 2025-06-30 DIAGNOSIS — E55.9 VITAMIN D DEFICIENCY: ICD-10-CM

## 2025-06-30 DIAGNOSIS — E11.9 TYPE 2 DIABETES MELLITUS WITHOUT COMPLICATIONS: ICD-10-CM

## 2025-07-01 ENCOUNTER — APPOINTMENT (OUTPATIENT)
Dept: PRIMARY CARE | Facility: CLINIC | Age: 55
End: 2025-07-01
Payer: COMMERCIAL

## 2025-08-03 DIAGNOSIS — J30.9 ALLERGIC RHINITIS, UNSPECIFIED SEASONALITY, UNSPECIFIED TRIGGER: ICD-10-CM

## 2025-08-03 DIAGNOSIS — F41.1 GENERALIZED ANXIETY DISORDER: ICD-10-CM

## 2025-08-03 RX ORDER — HYDROXYZINE PAMOATE 25 MG/1
25 CAPSULE ORAL DAILY PRN
Qty: 60 CAPSULE | Refills: 0 | Status: SHIPPED | OUTPATIENT
Start: 2025-08-03 | End: 2025-10-02

## 2025-08-07 ENCOUNTER — TELEPHONE (OUTPATIENT)
Dept: PRIMARY CARE | Facility: CLINIC | Age: 55
End: 2025-08-07
Payer: COMMERCIAL

## 2025-08-07 DIAGNOSIS — L25.8 CONTACT DERMATITIS DUE TO OTHER AGENT, UNSPECIFIED CONTACT DERMATITIS TYPE: Primary | ICD-10-CM

## 2025-08-07 RX ORDER — METHYLPREDNISOLONE 4 MG/1
TABLET ORAL
Qty: 21 TABLET | Refills: 0 | Status: SHIPPED | OUTPATIENT
Start: 2025-08-07 | End: 2025-08-13

## 2025-08-07 NOTE — TELEPHONE ENCOUNTER
Chief Complaint : Poison Ivy     Symptoms: rash an arms, belly and ankles    Duration: Over 2 weeks     Treatments: OTC meds    Patient Requesting:    Did the Patient have the covid Vaccine:    Pharmacy: [facility]     Covid Tested:

## 2025-08-30 DIAGNOSIS — J30.9 ALLERGIC RHINITIS, UNSPECIFIED SEASONALITY, UNSPECIFIED TRIGGER: ICD-10-CM

## 2025-08-30 DIAGNOSIS — F41.1 GENERALIZED ANXIETY DISORDER: ICD-10-CM

## 2025-09-01 RX ORDER — HYDROXYZINE PAMOATE 25 MG/1
25 CAPSULE ORAL DAILY PRN
Qty: 60 CAPSULE | Refills: 0 | OUTPATIENT
Start: 2025-09-01 | End: 2025-10-31

## 2025-09-02 ENCOUNTER — TELEPHONE (OUTPATIENT)
Dept: PRIMARY CARE | Facility: CLINIC | Age: 55
End: 2025-09-02
Payer: COMMERCIAL

## 2025-09-05 LAB
25(OH)D3+25(OH)D2 SERPL-MCNC: 27 NG/ML (ref 30–100)
ALBUMIN SERPL-MCNC: 4.2 G/DL (ref 3.6–5.1)
ALP SERPL-CCNC: 94 U/L (ref 37–153)
ALT SERPL-CCNC: 30 U/L (ref 6–29)
ANION GAP SERPL CALCULATED.4IONS-SCNC: 8 MMOL/L (CALC) (ref 7–17)
AST SERPL-CCNC: 35 U/L (ref 10–35)
BILIRUB SERPL-MCNC: 0.5 MG/DL (ref 0.2–1.2)
BUN SERPL-MCNC: 17 MG/DL (ref 7–25)
CALCIUM SERPL-MCNC: 9.3 MG/DL (ref 8.6–10.4)
CHLORIDE SERPL-SCNC: 106 MMOL/L (ref 98–110)
CHOLEST SERPL-MCNC: 224 MG/DL
CHOLEST/HDLC SERPL: 7 (CALC)
CO2 SERPL-SCNC: 24 MMOL/L (ref 20–32)
CREAT SERPL-MCNC: 0.55 MG/DL (ref 0.5–1.03)
EGFRCR SERPLBLD CKD-EPI 2021: 108 ML/MIN/1.73M2
EST. AVERAGE GLUCOSE BLD GHB EST-MCNC: 166 MG/DL
EST. AVERAGE GLUCOSE BLD GHB EST-SCNC: 9.2 MMOL/L
GLUCOSE SERPL-MCNC: 129 MG/DL (ref 65–99)
HBA1C MFR BLD: 7.4 %
HDLC SERPL-MCNC: 32 MG/DL
LDLC SERPL CALC-MCNC: 144 MG/DL (CALC)
NONHDLC SERPL-MCNC: 192 MG/DL (CALC)
POTASSIUM SERPL-SCNC: 4.2 MMOL/L (ref 3.5–5.3)
PROT SERPL-MCNC: 6.6 G/DL (ref 6.1–8.1)
SODIUM SERPL-SCNC: 138 MMOL/L (ref 135–146)
TRIGL SERPL-MCNC: 312 MG/DL

## 2025-09-09 ENCOUNTER — APPOINTMENT (OUTPATIENT)
Dept: PRIMARY CARE | Facility: CLINIC | Age: 55
End: 2025-09-09
Payer: COMMERCIAL

## 2026-05-29 ENCOUNTER — APPOINTMENT (OUTPATIENT)
Dept: OBSTETRICS AND GYNECOLOGY | Facility: CLINIC | Age: 56
End: 2026-05-29
Payer: COMMERCIAL